# Patient Record
Sex: MALE | Race: WHITE | NOT HISPANIC OR LATINO
[De-identification: names, ages, dates, MRNs, and addresses within clinical notes are randomized per-mention and may not be internally consistent; named-entity substitution may affect disease eponyms.]

---

## 2017-02-27 ENCOUNTER — CLINICAL ADVICE (OUTPATIENT)
Age: 82
End: 2017-02-27

## 2017-04-03 ENCOUNTER — APPOINTMENT (OUTPATIENT)
Dept: CT IMAGING | Facility: HOSPITAL | Age: 82
End: 2017-04-03

## 2017-04-03 ENCOUNTER — OUTPATIENT (OUTPATIENT)
Dept: OUTPATIENT SERVICES | Facility: HOSPITAL | Age: 82
LOS: 1 days | End: 2017-04-03
Payer: COMMERCIAL

## 2017-04-03 DIAGNOSIS — Z87.81 PERSONAL HISTORY OF (HEALED) TRAUMATIC FRACTURE: Chronic | ICD-10-CM

## 2017-04-03 DIAGNOSIS — Z96.643 PRESENCE OF ARTIFICIAL HIP JOINT, BILATERAL: Chronic | ICD-10-CM

## 2017-04-03 DIAGNOSIS — I71.4 ABDOMINAL AORTIC ANEURYSM, WITHOUT RUPTURE: Chronic | ICD-10-CM

## 2017-04-03 DIAGNOSIS — I71.9 AORTIC ANEURYSM OF UNSPECIFIED SITE, WITHOUT RUPTURE: ICD-10-CM

## 2017-04-03 DIAGNOSIS — D30.3 BENIGN NEOPLASM OF BLADDER: Chronic | ICD-10-CM

## 2017-04-03 PROCEDURE — 71250 CT THORAX DX C-: CPT

## 2017-04-03 PROCEDURE — 74176 CT ABD & PELVIS W/O CONTRAST: CPT

## 2017-04-20 ENCOUNTER — APPOINTMENT (OUTPATIENT)
Dept: THORACIC SURGERY | Facility: CLINIC | Age: 82
End: 2017-04-20

## 2017-04-20 VITALS
BODY MASS INDEX: 23.99 KG/M2 | DIASTOLIC BLOOD PRESSURE: 74 MMHG | HEIGHT: 65 IN | OXYGEN SATURATION: 94 % | RESPIRATION RATE: 16 BRPM | WEIGHT: 143.96 LBS | SYSTOLIC BLOOD PRESSURE: 120 MMHG | HEART RATE: 72 BPM

## 2017-04-25 ENCOUNTER — OUTPATIENT (OUTPATIENT)
Dept: OUTPATIENT SERVICES | Facility: HOSPITAL | Age: 82
LOS: 1 days | End: 2017-04-25
Payer: COMMERCIAL

## 2017-04-25 VITALS
WEIGHT: 143.96 LBS | HEART RATE: 64 BPM | TEMPERATURE: 98 F | SYSTOLIC BLOOD PRESSURE: 100 MMHG | RESPIRATION RATE: 16 BRPM | HEIGHT: 65 IN | DIASTOLIC BLOOD PRESSURE: 60 MMHG | OXYGEN SATURATION: 96 %

## 2017-04-25 DIAGNOSIS — Z96.643 PRESENCE OF ARTIFICIAL HIP JOINT, BILATERAL: Chronic | ICD-10-CM

## 2017-04-25 DIAGNOSIS — N32.9 BLADDER DISORDER, UNSPECIFIED: ICD-10-CM

## 2017-04-25 DIAGNOSIS — Z87.81 PERSONAL HISTORY OF (HEALED) TRAUMATIC FRACTURE: Chronic | ICD-10-CM

## 2017-04-25 DIAGNOSIS — D30.3 BENIGN NEOPLASM OF BLADDER: Chronic | ICD-10-CM

## 2017-04-25 DIAGNOSIS — N32.89 OTHER SPECIFIED DISORDERS OF BLADDER: ICD-10-CM

## 2017-04-25 DIAGNOSIS — Z01.818 ENCOUNTER FOR OTHER PREPROCEDURAL EXAMINATION: ICD-10-CM

## 2017-04-25 DIAGNOSIS — I71.4 ABDOMINAL AORTIC ANEURYSM, WITHOUT RUPTURE: Chronic | ICD-10-CM

## 2017-04-25 PROCEDURE — 71046 X-RAY EXAM CHEST 2 VIEWS: CPT

## 2017-04-25 PROCEDURE — 71020: CPT | Mod: 26

## 2017-04-25 PROCEDURE — G0463: CPT

## 2017-04-25 RX ORDER — SODIUM CHLORIDE 9 MG/ML
3 INJECTION INTRAMUSCULAR; INTRAVENOUS; SUBCUTANEOUS EVERY 8 HOURS
Qty: 0 | Refills: 0 | Status: DISCONTINUED | OUTPATIENT
Start: 2017-05-01 | End: 2017-05-09

## 2017-04-25 RX ORDER — ASPIRIN/CALCIUM CARB/MAGNESIUM 324 MG
1 TABLET ORAL
Qty: 0 | Refills: 0 | COMMUNITY

## 2017-04-25 NOTE — H&P PST ADULT - HISTORY OF PRESENT ILLNESS
Patient presented with wife, pt is poor historian. 80 yo  male  from home with PMH of HTN, Dementia, COPD (no intubation in past), BPH, Bladder mass, UTI , lung nodule and syncope x 3 ( last time few weeks ago- near syncope) presented with bladder lesions found upon cytoscopy done at Urology office in February 2017. Diagnosed with disorder of  bladder is scheduled for cytoscopy and transurethral resection of bladder tumor on 5/1/17.

## 2017-04-25 NOTE — H&P PST ADULT - ASSESSMENT
81 yo  male  from home with PMH of HTN, Dementia, COPD (no intubation in past), BPH, Bladder mass, recent UTI and syncope ( D/C from hospital on June 5, 2016) presented with malignant neoplasm of bladder . 81 yo  male  from home with PMH of HTN, Dementia, COPD (no intubation in past), BPH, Bladder mass, UTI , syncope, right lung mass, aortic aneurysm presented with disorder of bladder.

## 2017-04-25 NOTE — H&P PST ADULT - MUSCULOSKELETAL
details… detailed exam ROM intact/no joint erythema/no calf tenderness/diminished strength/no joint warmth/no joint swelling

## 2017-04-25 NOTE — H&P PST ADULT - RADIOLOGY RESULTS AND INTERPRETATION
The previously noted 5 mm nodular density projecting over the right midlung zone is not definitely appreciated on the current examination, probably obscured by ribs. Again if clinically indicated, chest CT without IV contrast may be pursued for further evaluation. Small left basilar atelectasis. Elevation of the left hemidiaphragm. No evidence for pleural effusion or pneumothorax. The cardiac silhouette is within normal limits.  Above results from June 2, 2016 of chest xray done during hospitalization faxed to PCP, Dr. Joe to acknowledge and to f/u on CT and to update her medical clearance, Patient asymptomatic at PST today 07/12/16

## 2017-04-25 NOTE — H&P PST ADULT - PSH
AAA (abdominal aortic aneurysm)  treated surgically in 2007  Abdominal aneurysm  repair- 2003  Benign bladder tumor  s/p TURPT- 2006, July 2016  Bladder cancer  low grade  History of wrist fracture  s/p sx of right- 2006  S/P hip replacement, bilateral  2004, 2005  S/P wrist surgery

## 2017-04-25 NOTE — H&P PST ADULT - PMH
AAA (abdominal aortic aneurysm)    Alzheimer disease    BPH (benign prostatic hyperplasia)    CAD (coronary artery disease)  no obstructive  Cataract  bilateral eyes  Cigarette nicotine dependence    CKD (chronic kidney disease), stage 3 (moderate)    COPD (chronic obstructive pulmonary disease)    HLD (hyperlipidemia)    Hypercholesteremia    Lung nodule  right  Primary osteoarthritis of both hips    Syncope  and near syncope  UTI (urinary tract infection)    Vitamin D deficiency

## 2017-04-25 NOTE — H&P PST ADULT - NEGATIVE GENERAL GENITOURINARY SYMPTOMS
no renal colic/no hematuria/no incontinence/no urine discoloration/no dysuria/no flank pain R/no flank pain L

## 2017-04-25 NOTE — H&P PST ADULT - PRIMARY CARE PROVIDER
Dr. David Vinson MD [facility] [phone], Freddy Us MD Cardiothoracic [phone], Josué Saleem MD Cardiovascular [phone]

## 2017-04-25 NOTE — H&P PST ADULT - VENOUS THROMBOEMBOLISM CURRENT STATUS
present cancer or chemotherapy/major surgery, including arthroscopic and laparoscopic (greater than 1 hr)/abnormal pulmonary function (COPD)

## 2017-04-25 NOTE — H&P PST ADULT - RS GEN PE MLT RESP DETAILS PC
no subcutaneous emphysema/breath sounds equal/no wheezes/no rales/no rhonchi/respirations non-labored/no chest wall tenderness/no intercostal retractions/airway patent

## 2017-04-25 NOTE — H&P PST ADULT - PROBLEM SELECTOR PLAN 1
Patient scheduled for cytoscopy and transurethral resection of bladder tumor on 5/1/17.Patient is at moderate risk for this surgery.

## 2017-04-25 NOTE — H&P PST ADULT - NSANTHOSAYNRD_GEN_A_CORE
No. PAM screening performed.  STOP BANG Legend: 0-2 = LOW Risk; 3-4 = INTERMEDIATE Risk; 5-8 = HIGH Risk/pt advised to see pulmonology to be assessed for sleep apnea

## 2017-04-25 NOTE — H&P PST ADULT - GASTROINTESTINAL DETAILS
nontender/no masses palpable/no distention/no rebound tenderness/no guarding/bowel sounds normal/no rigidity/soft

## 2017-04-30 ENCOUNTER — RESULT REVIEW (OUTPATIENT)
Age: 82
End: 2017-04-30

## 2017-05-01 ENCOUNTER — TRANSCRIPTION ENCOUNTER (OUTPATIENT)
Age: 82
End: 2017-05-01

## 2017-05-01 ENCOUNTER — OUTPATIENT (OUTPATIENT)
Dept: OUTPATIENT SERVICES | Facility: HOSPITAL | Age: 82
LOS: 1 days | Discharge: HOME | End: 2017-05-01
Payer: COMMERCIAL

## 2017-05-01 VITALS
SYSTOLIC BLOOD PRESSURE: 111 MMHG | HEART RATE: 68 BPM | RESPIRATION RATE: 16 BRPM | DIASTOLIC BLOOD PRESSURE: 76 MMHG | OXYGEN SATURATION: 96 % | TEMPERATURE: 98 F

## 2017-05-01 VITALS
RESPIRATION RATE: 14 BRPM | DIASTOLIC BLOOD PRESSURE: 64 MMHG | SYSTOLIC BLOOD PRESSURE: 101 MMHG | HEIGHT: 65 IN | TEMPERATURE: 98 F | WEIGHT: 143.96 LBS | HEART RATE: 62 BPM | OXYGEN SATURATION: 98 %

## 2017-05-01 DIAGNOSIS — I71.4 ABDOMINAL AORTIC ANEURYSM, WITHOUT RUPTURE: Chronic | ICD-10-CM

## 2017-05-01 DIAGNOSIS — Z01.818 ENCOUNTER FOR OTHER PREPROCEDURAL EXAMINATION: ICD-10-CM

## 2017-05-01 DIAGNOSIS — Z87.81 PERSONAL HISTORY OF (HEALED) TRAUMATIC FRACTURE: Chronic | ICD-10-CM

## 2017-05-01 DIAGNOSIS — N32.89 OTHER SPECIFIED DISORDERS OF BLADDER: ICD-10-CM

## 2017-05-01 DIAGNOSIS — Z96.643 PRESENCE OF ARTIFICIAL HIP JOINT, BILATERAL: Chronic | ICD-10-CM

## 2017-05-01 DIAGNOSIS — D30.3 BENIGN NEOPLASM OF BLADDER: Chronic | ICD-10-CM

## 2017-05-01 PROCEDURE — 52234 CYSTOSCOPY AND TREATMENT: CPT

## 2017-05-01 PROCEDURE — C1769: CPT

## 2017-05-01 PROCEDURE — 88307 TISSUE EXAM BY PATHOLOGIST: CPT

## 2017-05-01 PROCEDURE — 88307 TISSUE EXAM BY PATHOLOGIST: CPT | Mod: 26

## 2017-05-01 RX ORDER — HYDROMORPHONE HYDROCHLORIDE 2 MG/ML
0.25 INJECTION INTRAMUSCULAR; INTRAVENOUS; SUBCUTANEOUS
Qty: 0 | Refills: 0 | Status: DISCONTINUED | OUTPATIENT
Start: 2017-05-01 | End: 2017-05-01

## 2017-05-01 RX ORDER — MEMANTINE HYDROCHLORIDE 10 MG/1
1 TABLET ORAL
Qty: 0 | Refills: 0 | COMMUNITY
Start: 2017-05-01

## 2017-05-01 RX ORDER — SODIUM CHLORIDE 9 MG/ML
1000 INJECTION, SOLUTION INTRAVENOUS
Qty: 0 | Refills: 0 | Status: DISCONTINUED | OUTPATIENT
Start: 2017-05-01 | End: 2017-05-01

## 2017-05-01 RX ORDER — ASPIRIN/CALCIUM CARB/MAGNESIUM 324 MG
1 TABLET ORAL
Qty: 0 | Refills: 0 | COMMUNITY
Start: 2017-05-01

## 2017-05-01 RX ORDER — CHOLECALCIFEROL (VITAMIN D3) 125 MCG
1 CAPSULE ORAL
Qty: 0 | Refills: 0 | COMMUNITY
Start: 2017-05-01

## 2017-05-01 RX ORDER — DONEPEZIL HYDROCHLORIDE 10 MG/1
1 TABLET, FILM COATED ORAL
Qty: 0 | Refills: 0 | COMMUNITY
Start: 2017-05-01

## 2017-05-01 RX ORDER — SIMVASTATIN 20 MG/1
1 TABLET, FILM COATED ORAL
Qty: 0 | Refills: 0 | COMMUNITY
Start: 2017-05-01

## 2017-05-09 DIAGNOSIS — R55 SYNCOPE AND COLLAPSE: ICD-10-CM

## 2017-05-09 DIAGNOSIS — G30.8 OTHER ALZHEIMER'S DISEASE: ICD-10-CM

## 2017-05-09 DIAGNOSIS — Z87.891 PERSONAL HISTORY OF NICOTINE DEPENDENCE: ICD-10-CM

## 2017-05-09 DIAGNOSIS — F02.80 DEMENTIA IN OTHER DISEASES CLASSIFIED ELSEWHERE, UNSPECIFIED SEVERITY, WITHOUT BEHAVIORAL DISTURBANCE, PSYCHOTIC DISTURBANCE, MOOD DISTURBANCE, AND ANXIETY: ICD-10-CM

## 2017-05-09 DIAGNOSIS — N32.89 OTHER SPECIFIED DISORDERS OF BLADDER: ICD-10-CM

## 2017-05-09 DIAGNOSIS — R91.1 SOLITARY PULMONARY NODULE: ICD-10-CM

## 2017-05-09 DIAGNOSIS — Z87.440 PERSONAL HISTORY OF URINARY (TRACT) INFECTIONS: ICD-10-CM

## 2017-05-09 DIAGNOSIS — I25.10 ATHEROSCLEROTIC HEART DISEASE OF NATIVE CORONARY ARTERY WITHOUT ANGINA PECTORIS: ICD-10-CM

## 2017-05-09 DIAGNOSIS — E78.5 HYPERLIPIDEMIA, UNSPECIFIED: ICD-10-CM

## 2017-05-09 DIAGNOSIS — Z79.82 LONG TERM (CURRENT) USE OF ASPIRIN: ICD-10-CM

## 2017-05-09 DIAGNOSIS — E55.9 VITAMIN D DEFICIENCY, UNSPECIFIED: ICD-10-CM

## 2017-05-09 DIAGNOSIS — M16.0 BILATERAL PRIMARY OSTEOARTHRITIS OF HIP: ICD-10-CM

## 2017-05-09 DIAGNOSIS — N40.0 BENIGN PROSTATIC HYPERPLASIA WITHOUT LOWER URINARY TRACT SYMPTOMS: ICD-10-CM

## 2017-05-09 DIAGNOSIS — I10 ESSENTIAL (PRIMARY) HYPERTENSION: ICD-10-CM

## 2017-05-09 DIAGNOSIS — J44.9 CHRONIC OBSTRUCTIVE PULMONARY DISEASE, UNSPECIFIED: ICD-10-CM

## 2017-05-15 ENCOUNTER — FORM ENCOUNTER (OUTPATIENT)
Age: 82
End: 2017-05-15

## 2017-05-16 ENCOUNTER — APPOINTMENT (OUTPATIENT)
Dept: CT IMAGING | Facility: HOSPITAL | Age: 82
End: 2017-05-16

## 2017-05-16 ENCOUNTER — OUTPATIENT (OUTPATIENT)
Dept: OUTPATIENT SERVICES | Facility: HOSPITAL | Age: 82
LOS: 1 days | End: 2017-05-16
Payer: COMMERCIAL

## 2017-05-16 DIAGNOSIS — Z87.81 PERSONAL HISTORY OF (HEALED) TRAUMATIC FRACTURE: Chronic | ICD-10-CM

## 2017-05-16 DIAGNOSIS — Z96.643 PRESENCE OF ARTIFICIAL HIP JOINT, BILATERAL: Chronic | ICD-10-CM

## 2017-05-16 DIAGNOSIS — I71.4 ABDOMINAL AORTIC ANEURYSM, WITHOUT RUPTURE: Chronic | ICD-10-CM

## 2017-05-16 DIAGNOSIS — D30.3 BENIGN NEOPLASM OF BLADDER: Chronic | ICD-10-CM

## 2017-05-16 DIAGNOSIS — R91.1 SOLITARY PULMONARY NODULE: ICD-10-CM

## 2017-05-16 PROCEDURE — 71250 CT THORAX DX C-: CPT

## 2017-06-07 ENCOUNTER — APPOINTMENT (OUTPATIENT)
Dept: THORACIC SURGERY | Facility: CLINIC | Age: 82
End: 2017-06-07

## 2017-06-07 VITALS — OXYGEN SATURATION: 93 %

## 2017-06-07 VITALS
SYSTOLIC BLOOD PRESSURE: 128 MMHG | WEIGHT: 144 LBS | HEART RATE: 68 BPM | DIASTOLIC BLOOD PRESSURE: 68 MMHG | BODY MASS INDEX: 23.96 KG/M2 | TEMPERATURE: 97.6 F

## 2017-08-11 ENCOUNTER — INPATIENT (INPATIENT)
Facility: HOSPITAL | Age: 82
LOS: 6 days | Discharge: HOME HEALTH | DRG: 689 | End: 2017-08-18
Attending: INTERNAL MEDICINE | Admitting: INTERNAL MEDICINE
Payer: COMMERCIAL

## 2017-08-11 VITALS
HEIGHT: 68 IN | WEIGHT: 160.06 LBS | OXYGEN SATURATION: 98 % | RESPIRATION RATE: 16 BRPM | HEART RATE: 79 BPM | TEMPERATURE: 99 F | DIASTOLIC BLOOD PRESSURE: 78 MMHG | SYSTOLIC BLOOD PRESSURE: 101 MMHG

## 2017-08-11 DIAGNOSIS — I95.9 HYPOTENSION, UNSPECIFIED: ICD-10-CM

## 2017-08-11 DIAGNOSIS — N17.9 ACUTE KIDNEY FAILURE, UNSPECIFIED: ICD-10-CM

## 2017-08-11 DIAGNOSIS — Z96.643 PRESENCE OF ARTIFICIAL HIP JOINT, BILATERAL: Chronic | ICD-10-CM

## 2017-08-11 DIAGNOSIS — Z87.81 PERSONAL HISTORY OF (HEALED) TRAUMATIC FRACTURE: Chronic | ICD-10-CM

## 2017-08-11 DIAGNOSIS — Z29.9 ENCOUNTER FOR PROPHYLACTIC MEASURES, UNSPECIFIED: ICD-10-CM

## 2017-08-11 DIAGNOSIS — R53.1 WEAKNESS: ICD-10-CM

## 2017-08-11 DIAGNOSIS — R91.8 OTHER NONSPECIFIC ABNORMAL FINDING OF LUNG FIELD: ICD-10-CM

## 2017-08-11 DIAGNOSIS — R26.81 UNSTEADINESS ON FEET: ICD-10-CM

## 2017-08-11 DIAGNOSIS — N39.0 URINARY TRACT INFECTION, SITE NOT SPECIFIED: ICD-10-CM

## 2017-08-11 DIAGNOSIS — G30.9 ALZHEIMER'S DISEASE, UNSPECIFIED: ICD-10-CM

## 2017-08-11 DIAGNOSIS — D30.3 BENIGN NEOPLASM OF BLADDER: Chronic | ICD-10-CM

## 2017-08-11 DIAGNOSIS — I71.4 ABDOMINAL AORTIC ANEURYSM, WITHOUT RUPTURE: Chronic | ICD-10-CM

## 2017-08-11 LAB
ALBUMIN SERPL ELPH-MCNC: 2.8 G/DL — LOW (ref 3.5–5)
ALP SERPL-CCNC: 106 U/L — SIGNIFICANT CHANGE UP (ref 40–120)
ALT FLD-CCNC: 41 U/L DA — SIGNIFICANT CHANGE UP (ref 10–60)
ANION GAP SERPL CALC-SCNC: 7 MMOL/L — SIGNIFICANT CHANGE UP (ref 5–17)
APPEARANCE UR: ABNORMAL
AST SERPL-CCNC: 42 U/L — HIGH (ref 10–40)
BILIRUB SERPL-MCNC: 1.8 MG/DL — HIGH (ref 0.2–1.2)
BILIRUB UR-MCNC: NEGATIVE — SIGNIFICANT CHANGE UP
BUN SERPL-MCNC: 18 MG/DL — SIGNIFICANT CHANGE UP (ref 7–18)
CALCIUM SERPL-MCNC: 8.5 MG/DL — SIGNIFICANT CHANGE UP (ref 8.4–10.5)
CHLORIDE SERPL-SCNC: 102 MMOL/L — SIGNIFICANT CHANGE UP (ref 96–108)
CO2 SERPL-SCNC: 28 MMOL/L — SIGNIFICANT CHANGE UP (ref 22–31)
COLOR SPEC: YELLOW — SIGNIFICANT CHANGE UP
CREAT SERPL-MCNC: 1.46 MG/DL — HIGH (ref 0.5–1.3)
DIFF PNL FLD: ABNORMAL
GLUCOSE SERPL-MCNC: 106 MG/DL — HIGH (ref 70–99)
GLUCOSE UR QL: NEGATIVE — SIGNIFICANT CHANGE UP
HCT VFR BLD CALC: 43.8 % — SIGNIFICANT CHANGE UP (ref 39–50)
HGB BLD-MCNC: 14.4 G/DL — SIGNIFICANT CHANGE UP (ref 13–17)
KETONES UR-MCNC: ABNORMAL
LACTATE SERPL-SCNC: 1.8 MMOL/L — SIGNIFICANT CHANGE UP (ref 0.7–2)
LACTATE SERPL-SCNC: 2.4 MMOL/L — HIGH (ref 0.7–2)
LEUKOCYTE ESTERASE UR-ACNC: ABNORMAL
MCHC RBC-ENTMCNC: 31.8 PG — SIGNIFICANT CHANGE UP (ref 27–34)
MCHC RBC-ENTMCNC: 32.8 GM/DL — SIGNIFICANT CHANGE UP (ref 32–36)
MCV RBC AUTO: 96.7 FL — SIGNIFICANT CHANGE UP (ref 80–100)
NITRITE UR-MCNC: NEGATIVE — SIGNIFICANT CHANGE UP
PH UR: 5 — SIGNIFICANT CHANGE UP (ref 5–8)
PLATELET # BLD AUTO: 228 K/UL — SIGNIFICANT CHANGE UP (ref 150–400)
POTASSIUM SERPL-MCNC: 4.1 MMOL/L — SIGNIFICANT CHANGE UP (ref 3.5–5.3)
POTASSIUM SERPL-SCNC: 4.1 MMOL/L — SIGNIFICANT CHANGE UP (ref 3.5–5.3)
PROT SERPL-MCNC: 7.1 G/DL — SIGNIFICANT CHANGE UP (ref 6–8.3)
PROT UR-MCNC: 30 MG/DL
RBC # BLD: 4.53 M/UL — SIGNIFICANT CHANGE UP (ref 4.2–5.8)
RBC # FLD: 13.1 % — SIGNIFICANT CHANGE UP (ref 10.3–14.5)
SODIUM SERPL-SCNC: 137 MMOL/L — SIGNIFICANT CHANGE UP (ref 135–145)
SP GR SPEC: 1.02 — SIGNIFICANT CHANGE UP (ref 1.01–1.02)
UROBILINOGEN FLD QL: NEGATIVE — SIGNIFICANT CHANGE UP
WBC # BLD: 21.5 K/UL — HIGH (ref 3.8–10.5)
WBC # FLD AUTO: 21.5 K/UL — HIGH (ref 3.8–10.5)

## 2017-08-11 PROCEDURE — 71020: CPT | Mod: 26

## 2017-08-11 PROCEDURE — 99285 EMERGENCY DEPT VISIT HI MDM: CPT

## 2017-08-11 RX ORDER — CEFTRIAXONE 500 MG/1
1 INJECTION, POWDER, FOR SOLUTION INTRAMUSCULAR; INTRAVENOUS ONCE
Qty: 0 | Refills: 0 | Status: COMPLETED | OUTPATIENT
Start: 2017-08-11 | End: 2017-08-11

## 2017-08-11 RX ORDER — SODIUM CHLORIDE 9 MG/ML
1000 INJECTION INTRAMUSCULAR; INTRAVENOUS; SUBCUTANEOUS ONCE
Qty: 0 | Refills: 0 | Status: COMPLETED | OUTPATIENT
Start: 2017-08-11 | End: 2017-08-11

## 2017-08-11 RX ORDER — ENOXAPARIN SODIUM 100 MG/ML
40 INJECTION SUBCUTANEOUS DAILY
Qty: 0 | Refills: 0 | Status: DISCONTINUED | OUTPATIENT
Start: 2017-08-11 | End: 2017-08-18

## 2017-08-11 RX ORDER — CEFTRIAXONE 500 MG/1
1 INJECTION, POWDER, FOR SOLUTION INTRAMUSCULAR; INTRAVENOUS EVERY 24 HOURS
Qty: 0 | Refills: 0 | Status: DISCONTINUED | OUTPATIENT
Start: 2017-08-12 | End: 2017-08-15

## 2017-08-11 RX ORDER — SODIUM CHLORIDE 9 MG/ML
1000 INJECTION INTRAMUSCULAR; INTRAVENOUS; SUBCUTANEOUS
Qty: 0 | Refills: 0 | Status: DISCONTINUED | OUTPATIENT
Start: 2017-08-11 | End: 2017-08-12

## 2017-08-11 RX ORDER — SIMVASTATIN 20 MG/1
20 TABLET, FILM COATED ORAL AT BEDTIME
Qty: 0 | Refills: 0 | Status: DISCONTINUED | OUTPATIENT
Start: 2017-08-11 | End: 2017-08-15

## 2017-08-11 RX ORDER — DONEPEZIL HYDROCHLORIDE 10 MG/1
10 TABLET, FILM COATED ORAL AT BEDTIME
Qty: 0 | Refills: 0 | Status: DISCONTINUED | OUTPATIENT
Start: 2017-08-11 | End: 2017-08-13

## 2017-08-11 RX ORDER — MEMANTINE HYDROCHLORIDE 10 MG/1
10 TABLET ORAL
Qty: 0 | Refills: 0 | Status: DISCONTINUED | OUTPATIENT
Start: 2017-08-11 | End: 2017-08-13

## 2017-08-11 RX ORDER — CEFTRIAXONE 500 MG/1
INJECTION, POWDER, FOR SOLUTION INTRAMUSCULAR; INTRAVENOUS
Qty: 0 | Refills: 0 | Status: DISCONTINUED | OUTPATIENT
Start: 2017-08-11 | End: 2017-08-15

## 2017-08-11 RX ORDER — CHOLECALCIFEROL (VITAMIN D3) 125 MCG
1000 CAPSULE ORAL DAILY
Qty: 0 | Refills: 0 | Status: DISCONTINUED | OUTPATIENT
Start: 2017-08-11 | End: 2017-08-18

## 2017-08-11 RX ORDER — ASPIRIN/CALCIUM CARB/MAGNESIUM 324 MG
81 TABLET ORAL DAILY
Qty: 0 | Refills: 0 | Status: DISCONTINUED | OUTPATIENT
Start: 2017-08-11 | End: 2017-08-18

## 2017-08-11 RX ORDER — CEFEPIME 1 G/1
1000 INJECTION, POWDER, FOR SOLUTION INTRAMUSCULAR; INTRAVENOUS ONCE
Qty: 0 | Refills: 0 | Status: COMPLETED | OUTPATIENT
Start: 2017-08-11 | End: 2017-08-11

## 2017-08-11 RX ADMIN — SODIUM CHLORIDE 75 MILLILITER(S): 9 INJECTION INTRAMUSCULAR; INTRAVENOUS; SUBCUTANEOUS at 21:38

## 2017-08-11 RX ADMIN — DONEPEZIL HYDROCHLORIDE 10 MILLIGRAM(S): 10 TABLET, FILM COATED ORAL at 21:37

## 2017-08-11 RX ADMIN — SODIUM CHLORIDE 4000 MILLILITER(S): 9 INJECTION INTRAMUSCULAR; INTRAVENOUS; SUBCUTANEOUS at 13:16

## 2017-08-11 RX ADMIN — Medication 1000 UNIT(S): at 21:37

## 2017-08-11 RX ADMIN — SODIUM CHLORIDE 4000 MILLILITER(S): 9 INJECTION INTRAMUSCULAR; INTRAVENOUS; SUBCUTANEOUS at 14:38

## 2017-08-11 RX ADMIN — Medication 81 MILLIGRAM(S): at 21:37

## 2017-08-11 RX ADMIN — SODIUM CHLORIDE 4000 MILLILITER(S): 9 INJECTION INTRAMUSCULAR; INTRAVENOUS; SUBCUTANEOUS at 17:12

## 2017-08-11 RX ADMIN — SIMVASTATIN 20 MILLIGRAM(S): 20 TABLET, FILM COATED ORAL at 21:37

## 2017-08-11 RX ADMIN — CEFEPIME 100 MILLIGRAM(S): 1 INJECTION, POWDER, FOR SOLUTION INTRAMUSCULAR; INTRAVENOUS at 14:38

## 2017-08-11 RX ADMIN — CEFTRIAXONE 100 GRAM(S): 500 INJECTION, POWDER, FOR SOLUTION INTRAMUSCULAR; INTRAVENOUS at 22:54

## 2017-08-11 NOTE — ED PROVIDER NOTE - MEDICAL DECISION MAKING DETAILS
generalized weakness, likely due to infection given wbc of 21k  ua/u culture pending. cxr no new consolidations compared to ct done recently. iv fluids given, cefepime given. will admit for further mgmt

## 2017-08-11 NOTE — ED ADULT NURSE NOTE - OBJECTIVE STATEMENT
pt came in with wife states they went to MD office and went there for ff- up  after post bladder resection  pt BP went down to 500 pt sent from MD office to ED for further evaluation pt awake alert oriented x 2 wife on bedside no shortness of breath pt airway patent w/ NAD

## 2017-08-11 NOTE — H&P ADULT - PROBLEM SELECTOR PLAN 5
CT chest form May 2017 showed: Slight interval decrease in size of right middle lobe spiculated nodular opacity, now measuring 2.7 x 1.4 cm (previously 3.0 x 1.6 cm).   Patient denies any cough, sputum production or hemoptysis.   f/u Repeat CT chest.

## 2017-08-11 NOTE — H&P ADULT - PROBLEM SELECTOR PLAN 3
Creatinine of 1.46. (was 0.55 in Jun 2016), likely 2/2 dehydration with UTI.   Continue with Hydration for now.  f/u BMP in am.  If not improving, consider sending urine lytes.

## 2017-08-11 NOTE — H&P ADULT - PROBLEM SELECTOR PLAN 4
Likely due to dehydration 2/2 UTI or could be deconditions from advancing age.   Maintain adequate hydration and nutrition.  f/u Vitamin b12, folate and Vitamin D.  PT consult.  Fall precautions.

## 2017-08-11 NOTE — ED PROVIDER NOTE - OBJECTIVE STATEMENT
83 y/o male with PMHx of HTN, Alzheimer's, AAA, BPH, CAD, CKD, COPD, HLD, Lung Nodule, and Osteoarthritis and PSHx of Bladder Tumor removal BIB wife to the ED c/o increased fatigue and sleeping x2 days. Pt denies fever, chills, cough congestion, or any other complaints. NKDA. 83 y/o male with PMHx of HTN, Alzheimer's, AAA, BPH, CAD, CKD, COPD, HLD, Lung Nodule, and Osteoarthritis and PSHx of Bladder Tumor removal BIB wife to the ED c/o increased fatigue and sleeping x2 days. Pt denies fever, chills, cough congestion, CP, SOB, nausea, vomiting, or any other complaints. NKDA.

## 2017-08-11 NOTE — ED ADULT NURSE NOTE - CHIEF COMPLAINT QUOTE
sent from doctor office, per wife went  for follow up visit post bladder resection, patient became hypotensive  at doctors office, 200ml NS given by ems

## 2017-08-11 NOTE — H&P ADULT - NSHPLABSRESULTS_GEN_ALL_CORE
CBC:                       14.4   21.5  )-----------( 228      ( 11 Aug 2017 13:09 )             43.8     BMP:    08-11    137  |  102  |  18  ----------------------------<  106<H>  4.1   |  28  |  1.46<H>    Ca    8.5      11 Aug 2017 13:09    TPro  7.1  /  Alb  2.8<L>  /  TBili  1.8<H>  /  DBili  x   /  AST  42<H>  /  ALT  41  /  AlkPhos  106  08-11      CXR: No focal lung consolidation. Spiculated nodular densities described on CT the chest from May 16, 2017 are not well demonstrated on plain radiograph.  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 CBC:                       14.4   21.5  )-----------( 228      ( 11 Aug 2017 13:09 )             43.8     BMP:        137  |  102  |  18  ----------------------------<  106<H>  4.1   |  28  |  1.46<H>    Ca    8.5      11 Aug 2017 13:09    TPro  7.1  /  Alb  2.8<L>  /  TBili  1.8<H>  /  DBili  x   /  AST  42<H>  /  ALT  41  /  AlkPhos  106            Urinalysis Basic - ( 11 Aug 2017 14:40 )    Color: Yellow / Appearance: x / S.020 / pH: x  Gluc: x / Ketone: Small  / Bili: Negative / Urobili: Negative   Blood: x / Protein: 30 mg/dL / Nitrite: Negative   Leuk Esterase: Moderate / RBC: 2-5 /HPF / WBC 26-50 /HPF   Sq Epi: x / Non Sq Epi: Few / Bacteria: Moderate /HPF      CXR: No focal lung consolidation. Spiculated nodular densities described on CT the chest from May 16, 2017 are not well demonstrated on plain radiograph.  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

## 2017-08-11 NOTE — H&P ADULT - ATTENDING COMMENTS
Patient seen and examined; Agree with PGY2 MAR A/P above with editing as needed.    82 years old male from home, AAO x 3 but poor historian, lives with wife, walks independently, PMH of Alzheimer's, AAA (stable) , BPH, CKD, COPD, HLD, and Osteoarthritis and PSHx of Bladder Tumor (s/p resection in May 2017) brought in by wife to ED for c/o increased fatigue and weakness. Patient reported that he missed 2 steps while coming out of apartment building and fell down on his left side, got up by himself , was found to have bruise on left arm by wife. Denies any chest pain, sob, palpitations, abdominal pain, nausea, vomiting, headache, fever, recent travel. Denies any changes in BM. Denies any urinary complains including burning, dysuria or frequency.     In ED, upon admission T of 99.2, HR of 79, /78, RR 16, spO2 98%. EKG showed NSR. CXR : No focal lung consolidation. 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 Labs significant for elevated wbc of 21.5, Lactate of 2.4, UA positive for wbc of 26-50, moderate Leukocyte esterase.      D/D  Sepsis with UTI  Oliguria possible Prerenal azotemia due to dehydration  PMH as above    Plan:  Urine Cx  Ceftriaxone IV  Continue Home meds Patient seen and examined; Agree with PGY2 MAR A/P above with editing as needed.    82 years old male from home, AAO x 3 but poor historian, lives with wife, walks independently, PMH of Alzheimer's, AAA (stable) , BPH, CKD, COPD, HLD, and Osteoarthritis and PSHx of Bladder Tumor (s/p resection in May 2017) brought in by wife to ED for c/o increased fatigue and weakness. Patient reported that he missed 2 steps while coming out of apartment building and fell down on his left side, got up by himself , was found to have bruise on left arm by wife. Denies any chest pain, sob, palpitations, abdominal pain, nausea, vomiting, headache, fever, recent travel. Denies any changes in BM. Denies any urinary complains including burning, dysuria or frequency.     In ED, upon admission T of 99.2, HR of 79, /78, RR 16, spO2 98%. EKG showed NSR. CXR : No focal lung consolidation. 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 Labs significant for elevated wbc of 21.5, Lactate of 2.4, UA positive for wbc of 26-50, moderate Leukocyte esterase.      D/D  Sepsis with UTI  Oliguria possible Prerenal azotemia due to dehydration/ SAM due to dehydration  PMH as above    Plan:  Admit to Medicine; Urine Cx  Ceftriaxone IV  Continue Home meds  Please obtain from pharmacy; Haydee Max    Discussed with PGY2 MAR Dr. connelly;  Discussed with Floor RN kayy; Fluids; Diet; IVF

## 2017-08-11 NOTE — H&P ADULT - ASSESSMENT
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 In ED, upon admission T of 99.2, HR of 79, /78, RR 16, spO2 98%. EKG showed NSR. CXR : No focal lung consolidation. 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 Labs significant for elevated wbc of 21.5, Lactate of 2.4, UA positive for wbc of 26-50, moderate Leukocyte esterase. 1 dose of Maxipime and 3 L bolus given in ED. Patient admitted to medicine floor for further management.

## 2017-08-11 NOTE — H&P ADULT - PROBLEM SELECTOR PLAN 1
UA positive for wbc of 26-50, moderate Leukocyte esterase.  Urine cultures sent, f/u results.  s/p 1 dose of Maxipime and 3 L bolus given in ED.  Continue Rocephin and maintenance fluids.   f/u Blood cultures.

## 2017-08-11 NOTE — H&P ADULT - NSHPSOCIALHISTORY_GEN_ALL_CORE
Lives with wife in an apartment, walks independently at baseline.    Former smoker, per wife, patient smoked 2 packs per day initially for 30-40 years and gradually decreased, quit 1 years ago.  Social drinker.   Denied drug abuse.

## 2017-08-11 NOTE — ED ADULT TRIAGE NOTE - CHIEF COMPLAINT QUOTE
sent from doctor office for follow up visit for bladder resection, patient became hypotensive  at doctors office, 200ml NS given by ems sent from doctor office, per wife went  for follow up visit post bladder resection, patient became hypotensive  at doctors office, 200ml NS given by ems

## 2017-08-11 NOTE — H&P ADULT - HISTORY OF PRESENT ILLNESS
82 years old male from home, AAO x 3 but poor historian, lives with wife, walks independently, PMH of HTN, Alzheimer's, AAA (stable) , BPH, CAD, CKD, COPD, HLD, Lung Nodule, and Osteoarthritis and PSHx of Bladder Tumor (s/p resection in May 2017) brought in by wife to ED for c/o increased fatigue and weakness. Patient reported that he missed 2 steps while coming out of apartment building and fell down on his left side, got up by himself , was found to have bruise on left arm by wife. Denies any chest pain, sob, palpitations, abdominal pain, nausea, vomiting, headache, fever, recent travel. Denies any changes in BM. Denies any urinary complains including burning, dysuria or frequency.   In ED, upon admission T of 99.2, HR of 79, /78, RR 16, spO2 98%. EKG showed NSR. CXR : No focal lung consolidation. 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 Labs significant for elevated wbc of 21.5, Lactate of 2.4, UA positive for wbc of 26-50, moderate Leukocyte esterase. 82 years old male from home, AAO x 3 but poor historian, lives with wife, walks independently, PMH of Alzheimer's, AAA (stable) , BPH, CKD, COPD, HLD, and Osteoarthritis and PSHx of Bladder Tumor (s/p resection in May 2017) brought in by wife to ED for c/o increased fatigue and weakness. Patient reported that he missed 2 steps while coming out of apartment building and fell down on his left side, got up by himself , was found to have bruise on left arm by wife. Denies any chest pain, sob, palpitations, abdominal pain, nausea, vomiting, headache, fever, recent travel. Denies any changes in BM. Denies any urinary complains including burning, dysuria or frequency.   In ED, upon admission T of 99.2, HR of 79, /78, RR 16, spO2 98%. EKG showed NSR. CXR : No focal lung consolidation. 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 Labs significant for elevated wbc of 21.5, Lactate of 2.4, UA positive for wbc of 26-50, moderate Leukocyte esterase. 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 Patient reported that he missed 2 steps while coming out of apartment building and fell down on his left side, got up by himself , was complaining to have bruise on left arm. Denies any chest pain, sob, palpitations, abdominal pain, nausea, vomiting, headache, fever, recent travel. Denies any changes in BM. Denies any urinary complains including burning, dysuria or frequency. Called wife who provided a detailed history. Per wife, patient did not had any fall, was having unsteady gait and increased fatigue and has been sleeping more lately. Wife took patient to PCP's office and was found to have BP in 80s. So PCP recommended to come to ED for further evaluation. 911 was called and patient was brought to ED.   In ED, upon admission T of 99.2, HR of 79, /78, RR 16, spO2 98%. EKG showed NSR. CXR : No focal lung consolidation. 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 Labs significant for elevated wbc of 21.5, Lactate of 2.4, UA positive for wbc of 26-50, moderate Leukocyte esterase. 1 dose of Maxipime and 3 L bolus given in ED. Patient admitted to medicine floor for further management.

## 2017-08-11 NOTE — H&P ADULT - PROBLEM SELECTOR PLAN 2
Per wife, patient's BP was in 80s at PCP's office.  Upon admission to ED, it was in 100s.  s/p 3 L bolus, continue with maintenance fluids.

## 2017-08-11 NOTE — ED PROVIDER NOTE - CHPI ED SYMPTOMS NEG
no chills/no fever/no cough, no congestion no fever/no cough, no congestion, no CP, no SOB/no chills/no vomiting/no nausea

## 2017-08-12 LAB
24R-OH-CALCIDIOL SERPL-MCNC: 33.4 NG/ML — SIGNIFICANT CHANGE UP (ref 30–100)
ANION GAP SERPL CALC-SCNC: 11 MMOL/L — SIGNIFICANT CHANGE UP (ref 5–17)
APTT BLD: 39.8 SEC — HIGH (ref 27.5–37.4)
BASOPHILS # BLD AUTO: 0.1 K/UL — SIGNIFICANT CHANGE UP (ref 0–0.2)
BASOPHILS NFR BLD AUTO: 0.5 % — SIGNIFICANT CHANGE UP (ref 0–2)
BUN SERPL-MCNC: 19 MG/DL — HIGH (ref 7–18)
CALCIUM SERPL-MCNC: 8.4 MG/DL — SIGNIFICANT CHANGE UP (ref 8.4–10.5)
CHLORIDE SERPL-SCNC: 105 MMOL/L — SIGNIFICANT CHANGE UP (ref 96–108)
CHOLEST SERPL-MCNC: 94 MG/DL — SIGNIFICANT CHANGE UP (ref 10–199)
CO2 SERPL-SCNC: 25 MMOL/L — SIGNIFICANT CHANGE UP (ref 22–31)
CREAT SERPL-MCNC: 1.2 MG/DL — SIGNIFICANT CHANGE UP (ref 0.5–1.3)
EOSINOPHIL # BLD AUTO: 0 K/UL — SIGNIFICANT CHANGE UP (ref 0–0.5)
EOSINOPHIL NFR BLD AUTO: 0.1 % — SIGNIFICANT CHANGE UP (ref 0–6)
FOLATE SERPL-MCNC: >20 NG/ML — SIGNIFICANT CHANGE UP (ref 4.8–24.2)
GLUCOSE SERPL-MCNC: 99 MG/DL — SIGNIFICANT CHANGE UP (ref 70–99)
HBA1C BLD-MCNC: 5.5 % — SIGNIFICANT CHANGE UP (ref 4–5.6)
HCT VFR BLD CALC: 44 % — SIGNIFICANT CHANGE UP (ref 39–50)
HDLC SERPL-MCNC: 36 MG/DL — LOW (ref 40–125)
HGB BLD-MCNC: 14.4 G/DL — SIGNIFICANT CHANGE UP (ref 13–17)
INR BLD: 1.71 RATIO — HIGH (ref 0.88–1.16)
LIPID PNL WITH DIRECT LDL SERPL: 46 MG/DL — SIGNIFICANT CHANGE UP
LYMPHOCYTES # BLD AUTO: 0.9 K/UL — LOW (ref 1–3.3)
LYMPHOCYTES # BLD AUTO: 4.3 % — LOW (ref 13–44)
MAGNESIUM SERPL-MCNC: 2.4 MG/DL — SIGNIFICANT CHANGE UP (ref 1.6–2.6)
MCHC RBC-ENTMCNC: 31.3 PG — SIGNIFICANT CHANGE UP (ref 27–34)
MCHC RBC-ENTMCNC: 32.6 GM/DL — SIGNIFICANT CHANGE UP (ref 32–36)
MCV RBC AUTO: 96 FL — SIGNIFICANT CHANGE UP (ref 80–100)
MONOCYTES # BLD AUTO: 1.2 K/UL — HIGH (ref 0–0.9)
MONOCYTES NFR BLD AUTO: 5.5 % — SIGNIFICANT CHANGE UP (ref 2–14)
NEUTROPHILS # BLD AUTO: 19.1 K/UL — HIGH (ref 1.8–7.4)
NEUTROPHILS NFR BLD AUTO: 89.6 % — HIGH (ref 43–77)
PHOSPHATE SERPL-MCNC: 3 MG/DL — SIGNIFICANT CHANGE UP (ref 2.5–4.5)
PLATELET # BLD AUTO: 221 K/UL — SIGNIFICANT CHANGE UP (ref 150–400)
POTASSIUM SERPL-MCNC: 4.1 MMOL/L — SIGNIFICANT CHANGE UP (ref 3.5–5.3)
POTASSIUM SERPL-SCNC: 4.1 MMOL/L — SIGNIFICANT CHANGE UP (ref 3.5–5.3)
PROTHROM AB SERPL-ACNC: 18.8 SEC — HIGH (ref 9.8–12.7)
RBC # BLD: 4.58 M/UL — SIGNIFICANT CHANGE UP (ref 4.2–5.8)
RBC # FLD: 12.9 % — SIGNIFICANT CHANGE UP (ref 10.3–14.5)
SODIUM SERPL-SCNC: 141 MMOL/L — SIGNIFICANT CHANGE UP (ref 135–145)
TOTAL CHOLESTEROL/HDL RATIO MEASUREMENT: 2.6 RATIO — LOW (ref 3.4–9.6)
TRIGL SERPL-MCNC: 60 MG/DL — SIGNIFICANT CHANGE UP (ref 10–149)
TSH SERPL-MCNC: 1.25 UU/ML — SIGNIFICANT CHANGE UP (ref 0.34–4.82)
VIT B12 SERPL-MCNC: 1213 PG/ML — HIGH (ref 243–894)
WBC # BLD: 21.3 K/UL — HIGH (ref 3.8–10.5)
WBC # FLD AUTO: 21.3 K/UL — HIGH (ref 3.8–10.5)

## 2017-08-12 PROCEDURE — 71250 CT THORAX DX C-: CPT | Mod: 26

## 2017-08-12 RX ORDER — SODIUM CHLORIDE 9 MG/ML
500 INJECTION INTRAMUSCULAR; INTRAVENOUS; SUBCUTANEOUS ONCE
Qty: 0 | Refills: 0 | Status: COMPLETED | OUTPATIENT
Start: 2017-08-12 | End: 2017-08-12

## 2017-08-12 RX ORDER — SODIUM CHLORIDE 9 MG/ML
1000 INJECTION INTRAMUSCULAR; INTRAVENOUS; SUBCUTANEOUS
Qty: 0 | Refills: 0 | Status: DISCONTINUED | OUTPATIENT
Start: 2017-08-12 | End: 2017-08-12

## 2017-08-12 RX ADMIN — MEMANTINE HYDROCHLORIDE 10 MILLIGRAM(S): 10 TABLET ORAL at 17:05

## 2017-08-12 RX ADMIN — DONEPEZIL HYDROCHLORIDE 10 MILLIGRAM(S): 10 TABLET, FILM COATED ORAL at 21:31

## 2017-08-12 RX ADMIN — Medication 1000 UNIT(S): at 11:28

## 2017-08-12 RX ADMIN — ENOXAPARIN SODIUM 40 MILLIGRAM(S): 100 INJECTION SUBCUTANEOUS at 11:28

## 2017-08-12 RX ADMIN — CEFTRIAXONE 100 GRAM(S): 500 INJECTION, POWDER, FOR SOLUTION INTRAMUSCULAR; INTRAVENOUS at 22:18

## 2017-08-12 RX ADMIN — SODIUM CHLORIDE 1000 MILLILITER(S): 9 INJECTION INTRAMUSCULAR; INTRAVENOUS; SUBCUTANEOUS at 10:16

## 2017-08-12 RX ADMIN — SIMVASTATIN 20 MILLIGRAM(S): 20 TABLET, FILM COATED ORAL at 21:31

## 2017-08-12 RX ADMIN — SODIUM CHLORIDE 75 MILLILITER(S): 9 INJECTION INTRAMUSCULAR; INTRAVENOUS; SUBCUTANEOUS at 11:28

## 2017-08-12 RX ADMIN — Medication 81 MILLIGRAM(S): at 11:28

## 2017-08-12 RX ADMIN — MEMANTINE HYDROCHLORIDE 10 MILLIGRAM(S): 10 TABLET ORAL at 05:34

## 2017-08-12 NOTE — PROGRESS NOTE ADULT - PROBLEM SELECTOR PLAN 4
Likely due to dehydration 2/2 UTI or could be deconditions from advancing age.   Maintain adequate hydration and nutrition.  f/u Vitamin b12, folate and Vitamin D.  PT consult.  Fall precautions. Likely due to dehydration 2/2 UTI or could be deconditions from advancing age.   c/w IVF  f/u Vitamin b12, folate and Vitamin D.  PT rec HE for d/c planning  Fall precautions.

## 2017-08-12 NOTE — PROGRESS NOTE ADULT - PROBLEM SELECTOR PLAN 5
CT chest form May 2017 showed: Slight interval decrease in size of right middle lobe spiculated nodular opacity, now measuring 2.7 x 1.4 cm (previously 3.0 x 1.6 cm).   Patient denies any cough, sputum production or hemoptysis.   f/u Repeat CT chest. CT chest form May 2017 showed: Slight interval decrease in size of right middle lobe spiculated nodular opacity, now measuring 2.7 x 1.4 cm (previously 3.0 x 1.6 cm).   Patient denies any cough, sputum production or hemoptysis.   Repeat CT chest: Near complete resolution of multiple right lung opacities   which likely represented multifocal pneumonia.

## 2017-08-12 NOTE — PROGRESS NOTE ADULT - PROBLEM SELECTOR PLAN 2
BP in 90s-100s  s/p 500cc bolus today  Monitor BP closely BP in 90s-100s  s/p 500cc bolus today... Consent obtained for central line (in chart) if situation warrants  Monitor BP closely

## 2017-08-12 NOTE — PROGRESS NOTE ADULT - SUBJECTIVE AND OBJECTIVE BOX
PGY 1 Note discussed with supervising resident and primary attending    Patient is a 82y old  Male who presents with a chief complaint of hypotension, unsteady gait, increased fatigue. (11 Aug 2017 15:46)      INTERVAL HPI/OVERNIGHT EVENTS: Pt was seen and examined at bedside. No acute events overnight. Pt offers no new complaints; current symptoms stable.    MEDICATIONS  (STANDING):  donepezil 10 milliGRAM(s) Oral at bedtime  aspirin  chewable 81 milliGRAM(s) Oral daily  memantine 10 milliGRAM(s) Oral two times a day  simvastatin 20 milliGRAM(s) Oral at bedtime  cholecalciferol 1000 Unit(s) Oral daily  cefTRIAXone   IVPB   IV Intermittent   enoxaparin Injectable 40 milliGRAM(s) SubCutaneous daily  cefTRIAXone   IVPB 1 Gram(s) IV Intermittent every 24 hours    MEDICATIONS  (PRN):      __________________________________________________  REVIEW OF SYSTEMS:    CONSTITUTIONAL: No fever, chills, night sweats  EYES: no acute visual disturbances  NECK: No pain or stiffness  RESPIRATORY: No cough; No shortness of breath  CARDIOVASCULAR: No chest pain, no palpitations  GASTROINTESTINAL: No pain. No nausea or vomiting; No diarrhea  NEUROLOGICAL: No headache or numbness, no tremors  MUSCULOSKELETAL: No joint pain, no muscle pain  GENITOURINARY: no dysuria, no frequency, no hesitancy  PSYCHIATRY: no depression , no anxiety  ALL OTHER ROS negative        Vital Signs Last 24 Hrs  T(C): 37.3 (12 Aug 2017 07:40), Max: 37.6 (11 Aug 2017 23:33)  T(F): 99.2 (12 Aug 2017 07:40), Max: 99.6 (11 Aug 2017 23:33)  HR: 97 (12 Aug 2017 07:40) (78 - 101)  BP: 90/62 (12 Aug 2017 07:40) (90/62 - 135/88)  BP(mean): --  RR: 17 (12 Aug 2017 07:40) (16 - 17)  SpO2: 96% (12 Aug 2017 07:40) (96% - 99%)    ________________________________________________  PHYSICAL EXAM:  GENERAL: NAD. Well appearing, well nourished. Normal mood & affect.  HEENT: Normocephalic;  conjunctivae and sclerae clear; moist mucous membranes;   NECK : supple  CHEST/LUNG: Clear to auscultation bilaterally with good air entry   HEART: S1 S2  regular; no murmurs, gallops or rubs  ABDOMEN: Soft, Nontender, Nondistended; Bowel sounds present  EXTREMITIES: no cyanosis; no edema; no calf tenderness; peripheral pulses intact  SKIN: warm and dry; no rash  NERVOUS SYSTEM:  Awake and alert;     _________________________________________________  LABS:                        14.4   21.3  )-----------( 221      ( 12 Aug 2017 06:30 )             44.0     08-    141  |  105  |  19<H>  ----------------------------<  99  4.1   |  25  |  1.20    Ca    8.4      12 Aug 2017 06:30  Phos  3.0     08-  Mg     2.4     -    TPro  7.1  /  Alb  2.8<L>  /  TBili  1.8<H>  /  DBili  x   /  AST  42<H>  /  ALT  41  /  AlkPhos  106  08-11      Urinalysis Basic - ( 11 Aug 2017 14:40 )    Color: Yellow / Appearance: x / S.020 / pH: x  Gluc: x / Ketone: Small  / Bili: Negative / Urobili: Negative   Blood: x / Protein: 30 mg/dL / Nitrite: Negative   Leuk Esterase: Moderate / RBC: 2-5 /HPF / WBC 26-50 /HPF   Sq Epi: x / Non Sq Epi: Few / Bacteria: Moderate /HPF        Consultant(s) Notes Reviewed:   YES    Care Discussed with PGY-2/Attending/Consultants :  YES    Plan of care was discussed with patient and /or primary care giver; all questions and concerns were addressed and care was aligned with patient's wishes. PGY 1 Note discussed with supervising resident and primary attending    Patient is a 82y old  Male who presents with a chief complaint of hypotension, unsteady gait, increased fatigue. (11 Aug 2017 15:46)      INTERVAL HPI/OVERNIGHT EVENTS: Pt was seen and examined at bedside. No acute events overnight. Pt offers no new complaints; current symptoms stable.    MEDICATIONS  (STANDING):  donepezil 10 milliGRAM(s) Oral at bedtime  aspirin  chewable 81 milliGRAM(s) Oral daily  memantine 10 milliGRAM(s) Oral two times a day  simvastatin 20 milliGRAM(s) Oral at bedtime  cholecalciferol 1000 Unit(s) Oral daily  cefTRIAXone   IVPB   IV Intermittent   enoxaparin Injectable 40 milliGRAM(s) SubCutaneous daily  cefTRIAXone   IVPB 1 Gram(s) IV Intermittent every 24 hours      __________________________________________________  REVIEW OF SYSTEMS:    CONSTITUTIONAL: No fever, chills, night sweats  EYES: no acute visual disturbances  NECK: No pain or stiffness  RESPIRATORY: No cough; No shortness of breath  CARDIOVASCULAR: No chest pain, no palpitations  GASTROINTESTINAL: No pain. No nausea or vomiting; No diarrhea  NEUROLOGICAL: No headache or numbness, no tremors  MUSCULOSKELETAL: No joint pain, no muscle pain  GENITOURINARY: no dysuria, no frequency, no hesitancy  PSYCHIATRY: no depression , no anxiety  ALL OTHER ROS negative        Vital Signs Last 24 Hrs  T(C): 37.3 (12 Aug 2017 07:40), Max: 37.6 (11 Aug 2017 23:33)  T(F): 99.2 (12 Aug 2017 07:40), Max: 99.6 (11 Aug 2017 23:33)  HR: 97 (12 Aug 2017 07:40) (78 - 101)  BP: 90/62 (12 Aug 2017 07:40) (90/62 - 135/88)  RR: 17 (12 Aug 2017 07:40) (16 - 17)  SpO2: 96% (12 Aug 2017 07:40) (96% - 99%)    ________________________________________________  PHYSICAL EXAM:  GENERAL: NAD. Well appearing, well nourished. Normal mood & affect.  HEENT: Normocephalic;  conjunctivae and sclerae clear; moist mucous membranes;   NECK : supple  CHEST/LUNG: Clear to auscultation bilaterally with good air entry   HEART: S1 S2  regular; no murmurs, gallops or rubs  ABDOMEN: Soft, Nontender, Nondistended; Bowel sounds present  EXTREMITIES: no cyanosis; no edema; no calf tenderness; peripheral pulses intact  SKIN: warm and dry; no rash  NERVOUS SYSTEM:  Awake and alert;     _________________________________________________  LABS:                        14.4   21.3  )-----------( 221      ( 12 Aug 2017 06:30 )             44.0     08-12    141  |  105  |  19<H>  ----------------------------<  99  4.1   |  25  |  1.20    Ca    8.4      12 Aug 2017 06:30  Phos  3.0     08-12  Mg     2.4     08-12    TPro  7.1  /  Alb  2.8<L>  /  TBili  1.8<H>  /  DBili  x   /  AST  42<H>  /  ALT  41  /  AlkPhos  106  08-11      Urinalysis Basic - ( 11 Aug 2017 14:40 )    Color: Yellow / Appearance: x / S.020 / pH: x  Gluc: x / Ketone: Small  / Bili: Negative / Urobili: Negative   Blood: x / Protein: 30 mg/dL / Nitrite: Negative   Leuk Esterase: Moderate / RBC: 2-5 /HPF / WBC 26-50 /HPF   Sq Epi: x / Non Sq Epi: Few / Bacteria: Moderate /HPF        Consultant(s) Notes Reviewed:   YES    Care Discussed with PGY-2/Attending/Consultants :  YES    Plan of care was discussed with patient and /or primary care giver; all questions and concerns were addressed and care was aligned with patient's wishes. PGY 1 Note discussed with supervising resident and primary attending    Patient is a 82y old  Male who presents with a chief complaint of hypotension, unsteady gait, increased fatigue. (11 Aug 2017 15:46)      INTERVAL HPI/OVERNIGHT EVENTS: Pt was seen and examined at bedside. No acute events overnight. Pt offers no new complaints; current symptoms stable.    MEDICATIONS  (STANDING):  donepezil 10 milliGRAM(s) Oral at bedtime  aspirin  chewable 81 milliGRAM(s) Oral daily  memantine 10 milliGRAM(s) Oral two times a day  simvastatin 20 milliGRAM(s) Oral at bedtime  cholecalciferol 1000 Unit(s) Oral daily  cefTRIAXone   IVPB   IV Intermittent   enoxaparin Injectable 40 milliGRAM(s) SubCutaneous daily  cefTRIAXone   IVPB 1 Gram(s) IV Intermittent every 24 hours      __________________________________________________  REVIEW OF SYSTEMS:    CONSTITUTIONAL: No fever, chills, night sweats  RESPIRATORY: No cough; No shortness of breath  CARDIOVASCULAR: No chest pain, no palpitations  GASTROINTESTINAL: No pain. No nausea or vomiting; No diarrhea        Vital Signs Last 24 Hrs  T(C): 37.3 (12 Aug 2017 07:40), Max: 37.6 (11 Aug 2017 23:33)  T(F): 99.2 (12 Aug 2017 07:40), Max: 99.6 (11 Aug 2017 23:33)  HR: 97 (12 Aug 2017 07:40) (78 - 101)  BP: 90/62 (12 Aug 2017 07:40) (90/62 - 135/88)  RR: 17 (12 Aug 2017 07:40) (16 - 17)  SpO2: 96% (12 Aug 2017 07:40) (96% - 99%)    ________________________________________________  PHYSICAL EXAM:  GENERAL: NAD. Well appearing, well nourished. Normal mood & affect.  HEENT: Normocephalic;  conjunctivae and sclerae clear; moist mucous membranes;   NECK : supple  CHEST/LUNG: Clear to auscultation bilaterally with good air entry   HEART: S1 S2  regular; no murmurs, gallops or rubs  ABDOMEN: Soft, Nontender, Nondistended; Bowel sounds present  EXTREMITIES: no cyanosis; no edema; no calf tenderness; peripheral pulses intact  SKIN: warm and dry; no rash  NERVOUS SYSTEM:  Awake and alert    _________________________________________________  LABS:                        14.4   21.3  )-----------( 221      ( 12 Aug 2017 06:30 )             44.0     08-12    141  |  105  |  19<H>  ----------------------------<  99  4.1   |  25  |  1.20    Ca    8.4      12 Aug 2017 06:30  Phos  3.0     08-12  Mg     2.4     08-12    TPro  7.1  /  Alb  2.8<L>  /  TBili  1.8<H>  /  DBili  x   /  AST  42<H>  /  ALT  41  /  AlkPhos  106  08-11      Urinalysis Basic - ( 11 Aug 2017 14:40 )    Color: Yellow / Appearance: x / S.020 / pH: x  Gluc: x / Ketone: Small  / Bili: Negative / Urobili: Negative   Blood: x / Protein: 30 mg/dL / Nitrite: Negative   Leuk Esterase: Moderate / RBC: 2-5 /HPF / WBC 26-50 /HPF   Sq Epi: x / Non Sq Epi: Few / Bacteria: Moderate /HPF      CT Chest no cont:  IMPRESSION: Near complete resolution of multiple right lung opacities   which likely represented multifocal pneumonia. Residual opacities may   represent scarring, however, one more follow-up CT would be prudent to  ensure complete resolution or at least no interval growth.    Xray Chest:  IMPRESSION:    No focal lung consolidation. Spiculated nodular densities described on CT   the chest from May 16, 2017 are not well demonstrated on plain radiograph.    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    Consultant(s) Notes Reviewed:   YES    Care Discussed with PGY-2/Attending/Consultants :  YES    Plan of care was discussed with patient and /or primary care giver; all questions and concerns were addressed and care was aligned with patient's wishes.

## 2017-08-12 NOTE — PROGRESS NOTE ADULT - PROBLEM SELECTOR PLAN 6
Continue home meds, Namenda and Aricept.  Fall precautions. Stable  c/w home meds -Namenda and Aricept.  Fall precautions.

## 2017-08-12 NOTE — PROGRESS NOTE ADULT - PROBLEM SELECTOR PLAN 1
UA positive for wbc of 26-50, moderate Leukocyte esterase.  c/w Rocephin 1g daily   f/u UCx, BCx UA positive for wbc of 26-50, moderate leuk est.  c/w Rocephin 1g daily   f/u UCx, BCx

## 2017-08-12 NOTE — PROGRESS NOTE ADULT - ATTENDING COMMENTS
Patient seen and examined; Agree with PGY1 A/P above with editing as needed. Close friends at bedside    Patient comfortable, denies new complaints, appetite poor; No chest pain or abdominal pain; No fever/ sob/ cough;     Vitals stable except low BP     P/E: As above    labs: reviewed; stable    CT Chest No Cont (08.12.17 @ 09:35) >    IMPRESSION: Near complete resolution of multiple right lung opacities which likely represented multifocal pneumonia. Residual opacities may   represent scarring, however, one more follow-up CT would be prudent to ensure complete resolution or at least no interval growth.\      D/D  Hypotension likely due to dehydration and underlying UTI  Gait imbalance partly infection/ dehydration and Dementia related  SAM due to dehydration resolved  Dementia        Plan:  Continue IV Ceftriaxone  F/U Urin Cx  Monitor BP closely; Continue IVF; Bolus as needed.  Prior Spiculated Pneumonia has almost resolved on repeat CT Chest  Continue Namenda/ Aricept/ DVT ppx    d/w PGY 1 Dr. Hearn plan of care; d/w RN

## 2017-08-12 NOTE — PROGRESS NOTE ADULT - ASSESSMENT
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 In ED, upon admission T of 99.2, HR of 79, /78, RR 16, spO2 98%. EKG showed NSR. CXR : No focal lung consolidation. 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 Labs significant for elevated wbc of 21.5, Lactate of 2.4, UA positive for wbc of 26-50, moderate Leukocyte esterase. 1 dose of Maxipime and 3 L bolus given in ED. Patient admitted to medicine floor for further management. 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 1 dose of Maxipime and 3 L bolus given in ED. Pt admitted to medicine floor for management of UTI and hypotension.

## 2017-08-12 NOTE — PHYSICAL THERAPY INITIAL EVALUATION ADULT - CRITERIA FOR SKILLED THERAPEUTIC INTERVENTIONS
risk reduction/prevention/functional limitations in following categories/impairments found/rehab potential/anticipated discharge recommendation

## 2017-08-12 NOTE — PHYSICAL THERAPY INITIAL EVALUATION ADULT - DISCHARGE DISPOSITION, PT EVAL
rehabilitation facility/Patient presented with poor safety awareness and unsteady gait pattern  and would benefit from short term rehab to maximize functional mobility .

## 2017-08-12 NOTE — PHYSICAL THERAPY INITIAL EVALUATION ADULT - PLANNED THERAPY INTERVENTIONS, PT EVAL
strengthening/transfer training/bed mobility training/neuromuscular re-education/gait training/balance training

## 2017-08-13 DIAGNOSIS — Z71.89 OTHER SPECIFIED COUNSELING: ICD-10-CM

## 2017-08-13 LAB
ANION GAP SERPL CALC-SCNC: 8 MMOL/L — SIGNIFICANT CHANGE UP (ref 5–17)
BASOPHILS NFR BLD AUTO: 1 % — SIGNIFICANT CHANGE UP (ref 0–2)
BUN SERPL-MCNC: 19 MG/DL — HIGH (ref 7–18)
CALCIUM SERPL-MCNC: 8.3 MG/DL — LOW (ref 8.4–10.5)
CHLORIDE SERPL-SCNC: 104 MMOL/L — SIGNIFICANT CHANGE UP (ref 96–108)
CO2 SERPL-SCNC: 26 MMOL/L — SIGNIFICANT CHANGE UP (ref 22–31)
CORTIS AM PEAK SERPL-MCNC: 20.8 UG/DL — HIGH (ref 6–18.4)
CREAT SERPL-MCNC: 1.2 MG/DL — SIGNIFICANT CHANGE UP (ref 0.5–1.3)
CULTURE RESULTS: NO GROWTH — SIGNIFICANT CHANGE UP
GLUCOSE SERPL-MCNC: 109 MG/DL — HIGH (ref 70–99)
HCT VFR BLD CALC: 41.9 % — SIGNIFICANT CHANGE UP (ref 39–50)
HGB BLD-MCNC: 13.7 G/DL — SIGNIFICANT CHANGE UP (ref 13–17)
LYMPHOCYTES # BLD AUTO: 10 % — LOW (ref 13–44)
MCHC RBC-ENTMCNC: 31 PG — SIGNIFICANT CHANGE UP (ref 27–34)
MCHC RBC-ENTMCNC: 32.7 GM/DL — SIGNIFICANT CHANGE UP (ref 32–36)
MCV RBC AUTO: 94.8 FL — SIGNIFICANT CHANGE UP (ref 80–100)
MONOCYTES NFR BLD AUTO: 6 % — SIGNIFICANT CHANGE UP (ref 2–14)
NEUTROPHILS NFR BLD AUTO: 82 % — HIGH (ref 43–77)
PLATELET # BLD AUTO: 252 K/UL — SIGNIFICANT CHANGE UP (ref 150–400)
POTASSIUM SERPL-MCNC: 4 MMOL/L — SIGNIFICANT CHANGE UP (ref 3.5–5.3)
POTASSIUM SERPL-SCNC: 4 MMOL/L — SIGNIFICANT CHANGE UP (ref 3.5–5.3)
RBC # BLD: 4.42 M/UL — SIGNIFICANT CHANGE UP (ref 4.2–5.8)
RBC # FLD: 13.2 % — SIGNIFICANT CHANGE UP (ref 10.3–14.5)
SODIUM SERPL-SCNC: 138 MMOL/L — SIGNIFICANT CHANGE UP (ref 135–145)
SPECIMEN SOURCE: SIGNIFICANT CHANGE UP
WBC # BLD: 24.9 K/UL — HIGH (ref 3.8–10.5)
WBC # FLD AUTO: 24.9 K/UL — HIGH (ref 3.8–10.5)

## 2017-08-13 PROCEDURE — 71010: CPT | Mod: 26

## 2017-08-13 RX ORDER — SODIUM CHLORIDE 9 MG/ML
500 INJECTION INTRAMUSCULAR; INTRAVENOUS; SUBCUTANEOUS ONCE
Qty: 0 | Refills: 0 | Status: COMPLETED | OUTPATIENT
Start: 2017-08-13 | End: 2017-08-13

## 2017-08-13 RX ORDER — SODIUM CHLORIDE 9 MG/ML
1000 INJECTION INTRAMUSCULAR; INTRAVENOUS; SUBCUTANEOUS
Qty: 0 | Refills: 0 | Status: DISCONTINUED | OUTPATIENT
Start: 2017-08-13 | End: 2017-08-17

## 2017-08-13 RX ORDER — DONEPEZIL HYDROCHLORIDE 10 MG/1
5 TABLET, FILM COATED ORAL AT BEDTIME
Qty: 0 | Refills: 0 | Status: DISCONTINUED | OUTPATIENT
Start: 2017-08-13 | End: 2017-08-18

## 2017-08-13 RX ORDER — SODIUM CHLORIDE 9 MG/ML
500 INJECTION INTRAMUSCULAR; INTRAVENOUS; SUBCUTANEOUS ONCE
Qty: 0 | Refills: 0 | Status: DISCONTINUED | OUTPATIENT
Start: 2017-08-13 | End: 2017-08-13

## 2017-08-13 RX ORDER — MEMANTINE HYDROCHLORIDE 10 MG/1
5 TABLET ORAL
Qty: 0 | Refills: 0 | Status: DISCONTINUED | OUTPATIENT
Start: 2017-08-13 | End: 2017-08-18

## 2017-08-13 RX ORDER — SODIUM CHLORIDE 9 MG/ML
50 INJECTION INTRAMUSCULAR; INTRAVENOUS; SUBCUTANEOUS ONCE
Qty: 0 | Refills: 0 | Status: DISCONTINUED | OUTPATIENT
Start: 2017-08-13 | End: 2017-08-13

## 2017-08-13 RX ORDER — MIDODRINE HYDROCHLORIDE 2.5 MG/1
10 TABLET ORAL EVERY 8 HOURS
Qty: 0 | Refills: 0 | Status: DISCONTINUED | OUTPATIENT
Start: 2017-08-13 | End: 2017-08-13

## 2017-08-13 RX ADMIN — MIDODRINE HYDROCHLORIDE 10 MILLIGRAM(S): 2.5 TABLET ORAL at 10:24

## 2017-08-13 RX ADMIN — CEFTRIAXONE 100 GRAM(S): 500 INJECTION, POWDER, FOR SOLUTION INTRAMUSCULAR; INTRAVENOUS at 21:31

## 2017-08-13 RX ADMIN — SODIUM CHLORIDE 1000 MILLILITER(S): 9 INJECTION INTRAMUSCULAR; INTRAVENOUS; SUBCUTANEOUS at 08:32

## 2017-08-13 RX ADMIN — DONEPEZIL HYDROCHLORIDE 5 MILLIGRAM(S): 10 TABLET, FILM COATED ORAL at 21:31

## 2017-08-13 RX ADMIN — Medication 81 MILLIGRAM(S): at 11:43

## 2017-08-13 RX ADMIN — MEMANTINE HYDROCHLORIDE 10 MILLIGRAM(S): 10 TABLET ORAL at 05:22

## 2017-08-13 RX ADMIN — MEMANTINE HYDROCHLORIDE 5 MILLIGRAM(S): 10 TABLET ORAL at 17:07

## 2017-08-13 RX ADMIN — SODIUM CHLORIDE 75 MILLILITER(S): 9 INJECTION INTRAMUSCULAR; INTRAVENOUS; SUBCUTANEOUS at 10:24

## 2017-08-13 RX ADMIN — Medication 1000 UNIT(S): at 11:43

## 2017-08-13 RX ADMIN — ENOXAPARIN SODIUM 40 MILLIGRAM(S): 100 INJECTION SUBCUTANEOUS at 11:43

## 2017-08-13 RX ADMIN — SIMVASTATIN 20 MILLIGRAM(S): 20 TABLET, FILM COATED ORAL at 21:31

## 2017-08-13 NOTE — PROGRESS NOTE ADULT - SUBJECTIVE AND OBJECTIVE BOX
MEDICAL ATTENDING NOTE  Patient is a 82y old  Male who presents with a chief complaint of hypotension, unsteady gait, increased fatigue. (11 Aug 2017 15:46)      INTERVAL HPI/OVERNIGHT EVENTS: no new complaints    MEDICATIONS  (STANDING):  aspirin  chewable 81 milliGRAM(s) Oral daily  simvastatin 20 milliGRAM(s) Oral at bedtime  cholecalciferol 1000 Unit(s) Oral daily  cefTRIAXone   IVPB   IV Intermittent   enoxaparin Injectable 40 milliGRAM(s) SubCutaneous daily  cefTRIAXone   IVPB 1 Gram(s) IV Intermittent every 24 hours  sodium chloride 0.9%. 1000 milliLiter(s) (75 mL/Hr) IV Continuous <Continuous>  donepezil 5 milliGRAM(s) Oral at bedtime  memantine 5 milliGRAM(s) Oral two times a day    MEDICATIONS  (PRN):      __________________________________________________  REVIEW OF SYSTEMS:    CONSTITUTIONAL: No fever,   EYES: no acute visual disturbances  NECK: No pain or stiffness  RESPIRATORY: No cough; No shortness of breath  CARDIOVASCULAR: No chest pain, no palpitations  GASTROINTESTINAL: No pain. No nausea or vomiting; No diarrhea   NEUROLOGICAL: No headache or numbness, no tremors  MUSCULOSKELETAL: No joint pain, no muscle pain  GENITOURINARY: no dysuria, no frequency, no hesitancy  PSYCHIATRY: no depression , no anxiety  ALL OTHER  ROS negative        Vital Signs Last 24 Hrs  T(C): 37.2 (13 Aug 2017 09:29), Max: 37.2 (13 Aug 2017 09:29)  T(F): 98.9 (13 Aug 2017 09:29), Max: 98.9 (13 Aug 2017 09:29)  HR: 78 (13 Aug 2017 09:29) (57 - 113)  BP: 93/65 (13 Aug 2017 09:29) (85/55 - 95/65)  BP(mean): --  RR: 17 (13 Aug 2017 09:29) (16 - 17)  SpO2: 98% (13 Aug 2017 09:29) (95% - 98%)    ________________________________________________  PHYSICAL EXAM:  GENERAL: NAD  HEENT: Normocephalic;  conjunctivae and sclerae clear; moist mucous membranes;   NECK : supple  CHEST/LUNG: Clear to auscultation bilaterally with good air entry   HEART: S1 S2  regular; no murmurs, gallops or rubs  ABDOMEN: Soft, Nontender, Nondistended; Bowel sounds present  EXTREMITIES: no cyanosis; no edema; no calf tenderness  SKIN: warm and dry; no rash  NERVOUS SYSTEM:  Awake and alert; Oriented  to place, person and time ; no new deficits    _________________________________________________  LABS:                        13.7   24.9  )-----------( 252      ( 13 Aug 2017 06:23 )             41.9     08-13    138  |  104  |  19<H>  ----------------------------<  109<H>  4.0   |  26  |  1.20    Ca    8.3<L>      13 Aug 2017 06:23  Phos  3.0     08-12  Mg     2.4     08-12      PT/INR - ( 12 Aug 2017 15:14 )   PT: 18.8 sec;   INR: 1.71 ratio         PTT - ( 12 Aug 2017 15:14 )  PTT:39.8 sec  Urinalysis Basic - ( 11 Aug 2017 14:40 )    Color: Yellow / Appearance: x / S.020 / pH: x  Gluc: x / Ketone: Small  / Bili: Negative / Urobili: Negative   Blood: x / Protein: 30 mg/dL / Nitrite: Negative   Leuk Esterase: Moderate / RBC: 2-5 /HPF / WBC 26-50 /HPF   Sq Epi: x / Non Sq Epi: Few / Bacteria: Moderate /HPF      CAPILLARY BLOOD GLUCOSE            RADIOLOGY & ADDITIONAL TESTS:    Imaging Personally Reviewed:  YES/NO    Consultant(s) Notes Reviewed:   YES/ No    Care Discussed with Consultants :     Plan of care was discussed with patient and /or primary care giver; all questions and concerns were addressed and care was aligned with patient's wishes. MEDICAL ATTENDING NOTE  Patient is a 82y old  Male who presents with a chief complaint of hypotension, unsteady gait, increased fatigue. (11 Aug 2017 15:46)      INTERVAL HPI/OVERNIGHT EVENTS: no new complaints; BP persistently in the lower end 90's; Drowsy but easily arousable; Family (Son from [place]) at bedside; As per son, at baseline ambulation is limited; Patient also has Hx Falls and hypotension as per Son.     MEDICATIONS  (STANDING):  aspirin  chewable 81 milliGRAM(s) Oral daily  simvastatin 20 milliGRAM(s) Oral at bedtime  cholecalciferol 1000 Unit(s) Oral daily  cefTRIAXone   IVPB   IV Intermittent   enoxaparin Injectable 40 milliGRAM(s) SubCutaneous daily  cefTRIAXone   IVPB 1 Gram(s) IV Intermittent every 24 hours  sodium chloride 0.9%. 1000 milliLiter(s) (75 mL/Hr) IV Continuous <Continuous>  donepezil 5 milliGRAM(s) Oral at bedtime  memantine 5 milliGRAM(s) Oral two times a day      __________________________________________________  REVIEW OF SYSTEMS:    CONSTITUTIONAL: No fever,   NECK: No pain or stiffness  RESPIRATORY: No cough; No shortness of breath  CARDIOVASCULAR: No chest pain, no palpitations  GASTROINTESTINAL: No pain. No nausea or vomiting; No diarrhea   NEUROLOGICAL: No headache or numbness, no tremors  MUSCULOSKELETAL: No joint pain, no muscle pain  GENITOURINARY: no dysuria, no frequency, no hesitancy  PSYCHIATRY: has dementia  ALL OTHER  ROS negative        Vital Signs Last 24 Hrs  T(C): 37.2 (13 Aug 2017 09:29), Max: 37.2 (13 Aug 2017 09:29)  T(F): 98.9 (13 Aug 2017 09:29), Max: 98.9 (13 Aug 2017 09:29)  HR: 78 (13 Aug 2017 09:29) (57 - 113)  BP: 93/65 (13 Aug 2017 09:29) (85/55 - 95/65)  RR: 17 (13 Aug 2017 09:29) (16 - 17)  SpO2: 98% (13 Aug 2017 09:29) (95% - 98%)    ________________________________________________  PHYSICAL EXAM:  GENERAL: NAD  HEENT: Normocephalic;  conjunctivae and sclerae clear; moist mucous membranes;   NECK : supple  CHEST/LUNG: Clear to auscultation bilaterally with good air entry   HEART: S1 S2  regular; no murmurs, gallops or rubs  ABDOMEN: Soft, Nontender, Nondistended; Bowel sounds present  EXTREMITIES: no cyanosis; no edema; no calf tenderness  SKIN: warm and dry; no rash  NERVOUS SYSTEM: Follows commands; Limited exam;   _________________________________________________  LABS:                        13.7   24.9  )-----------( 252      ( 13 Aug 2017 06:23 )             41.9     08-13    138  |  104  |  19<H>  ----------------------------<  109<H>  4.0   |  26  |  1.20    Ca    8.3<L>      13 Aug 2017 06:23  Phos  3.0     08-12  Mg     2.4     08-12      PT/INR - ( 12 Aug 2017 15:14 )   PT: 18.8 sec;   INR: 1.71 ratio         PTT - ( 12 Aug 2017 15:14 )  PTT:39.8 sec      RADIOLOGY & ADDITIONAL TESTS: No new      Consultant(s) Notes Reviewed:   YES; Discussed with ID      Plan of care was discussed with patient and /or primary care giver; all questions and concerns were addressed and care was aligned with patient's wishes.

## 2017-08-13 NOTE — PROGRESS NOTE ADULT - PROBLEM SELECTOR PLAN 4
Likely due to dehydration 2/2 UTI or could be deconditions from advancing age.   c/w IVF  f/u Vitamin b12, folate and Vitamin D which is WNL  PT rec HE for d/c planning  Fall precautions.

## 2017-08-13 NOTE — PROGRESS NOTE ADULT - PROBLEM SELECTOR PLAN 1
c/w Rocephin 1g daily;f/u UCx, BCx  Leucocytosis trending up concerninf for Resistant organism;   Uirne Cx collected after a dose of Cefepime; Discussed with ID dr. Gong to see if need to switch to Broad spectrum coverage to cover resistant organism to ceftriaxone.

## 2017-08-13 NOTE — PROGRESS NOTE ADULT - ASSESSMENT
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 1 dose of Maxipime and 3 L bolus given in ED. Pt admitted to medicine floor for management of UTI and hypotension.

## 2017-08-13 NOTE — CHART NOTE - NSCHARTNOTEFT_GEN_A_CORE
Blood pressure 92/66 at 7:30 am. Ordered repeat vitals. Started patient on 75ml/hr NS. Added midodrine 10 mg Q8 hrs for low blood pressure. Discussed with Dr Gallego PGY3. Blood pressure 92/66 at 7:30 am. repeat vitals 93//65. 500 NS bolus given. Started patient on 75ml/hr NS. Added midodrine 10 mg Q8 hrs for low blood pressure. Serum cortisol am ordered for tomorrow morning. Discussed with Dr Gallego PGY3.

## 2017-08-13 NOTE — PROGRESS NOTE ADULT - PROBLEM SELECTOR PLAN 8
Spoke with Son at bedside and wife over the phone reviewed Patient prior expressed wishes. She has Living will and confirms Patient is DNR status.

## 2017-08-13 NOTE — PROGRESS NOTE ADULT - ATTENDING COMMENTS
Follow up ID consult  Discussed with CHAPITO Johnson; Reviewed Advance directives; She will sign off DNR consent in chart.  Patient was given Midodrine by housestaff; d/w PGY3 on call; will hold off Midodrine and will resume if no infectious etiology found.

## 2017-08-13 NOTE — PROGRESS NOTE ADULT - PROBLEM SELECTOR PLAN 2
BP in 90s-100s; Monitor BP closely  Patient given bolus earlier today; Continue maintenance IVF; Patient has poor oral intake.

## 2017-08-13 NOTE — PROGRESS NOTE ADULT - PROBLEM SELECTOR PLAN 6
Stable; Discussed with wife, Patient has been on Aricept and Namenda for about 5 yrs; Given persistent low BP, will cut back Aricept and Namenda ;  Discussed with Son at bedside and wife over the phone; Agreed.

## 2017-08-13 NOTE — CONSULT NOTE ADULT - SUBJECTIVE AND OBJECTIVE BOX
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 Patient reported that he missed 2 steps while coming out of apartment building and fell down on his left side, got up by himself , was complaining to have bruise on left arm. Denies any chest pain, sob, palpitations, abdominal pain, nausea, vomiting, headache, fever, recent travel. Denies any changes in BM. Denies any urinary complains including burning, dysuria or frequency. Called wife who provided a detailed history. Per wife, patient did not had any fall, was having unsteady gait and increased fatigue and has been sleeping more lately. Wife took patient to PCP's office and was found to have BP in 80s. So PCP recommended to come to ED for further evaluation. 911 was called and patient was brought to ED.   In ED, upon admission T of 99.2, HR of 79, /78, RR 16, spO2 98%. EKG showed NSR. CXR : No focal lung consolidation. 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 Labs significant for elevated wbc of 21.5, Lactate of 2.4, UA positive for wbc of 26-50, moderate Leukocyte esterase. 1 dose of Maxipime and 3 L bolus given in ED. Patient admitted to medicine floor for further management. (11 Aug 2017 15:46)      PAST MEDICAL & SURGICAL HISTORY:  CAD (coronary artery disease): no obstructive  Lung nodule: right  Primary osteoarthritis of both hips  COPD (chronic obstructive pulmonary disease)  Cigarette nicotine dependence  Cataract: bilateral eyes  CKD (chronic kidney disease), stage 3 (moderate)  UTI (urinary tract infection)  Syncope: and near syncope  Vitamin D deficiency  AAA (abdominal aortic aneurysm)  HLD (hyperlipidemia)  BPH (benign prostatic hyperplasia)  Alzheimer disease  Hypercholesteremia  History of wrist fracture: s/p sx of right- 2006  Benign bladder tumor: s/p TURPT- 2006, July 2016  Abdominal aneurysm: repair- 2003  S/P hip replacement, bilateral: 2004, 2005  Bladder cancer: low grade  S/P wrist surgery  AAA (abdominal aortic aneurysm): treated surgically in 2007      No Known Allergies      Meds:  aspirin  chewable 81 milliGRAM(s) Oral daily  simvastatin 20 milliGRAM(s) Oral at bedtime  cholecalciferol 1000 Unit(s) Oral daily  cefTRIAXone   IVPB   IV Intermittent   enoxaparin Injectable 40 milliGRAM(s) SubCutaneous daily  cefTRIAXone   IVPB 1 Gram(s) IV Intermittent every 24 hours  sodium chloride 0.9%. 1000 milliLiter(s) IV Continuous <Continuous>  donepezil 5 milliGRAM(s) Oral at bedtime  memantine 5 milliGRAM(s) Oral two times a day      SOCIAL HISTORY:  Smoker:  YES / NO        PACK YEARS:                         WHEN QUIT?  ETOH use:  YES / NO               FREQUENCY / QUANTITY:  Ilicit Drug use:  YES / NO  Occupation:  Assisted device use (Cane / Walker):  Live with:    FAMILY HISTORY:      VITALS:  Vital Signs Last 24 Hrs  T(C): 37.7 (13 Aug 2017 15:03), Max: 37.7 (13 Aug 2017 15:03)  T(F): 99.9 (13 Aug 2017 15:03), Max: 99.9 (13 Aug 2017 15:03)  HR: 112 (13 Aug 2017 15:03) (57 - 113)  BP: 94/63 (13 Aug 2017 15:03) (85/55 - 94/63)  BP(mean): --  RR: 16 (13 Aug 2017 15:03) (16 - 17)  SpO2: 95% (13 Aug 2017 15:03) (95% - 98%)    LABS/DIAGNOSTIC TESTS:                          13.7   24.9  )-----------( 252      ( 13 Aug 2017 06:23 )             41.9     WBC Count: 24.9 K/uL (08-13 @ 06:23)  WBC Count: 21.3 K/uL (08-12 @ 06:30)  WBC Count: 21.5 K/uL (08-11 @ 13:09)      08-13    138  |  104  |  19<H>  ----------------------------<  109<H>  4.0   |  26  |  1.20    Ca    8.3<L>      13 Aug 2017 06:23  Phos  3.0     08-12  Mg     2.4     08-12                PT/INR - ( 12 Aug 2017 15:14 )   PT: 18.8 sec;   INR: 1.71 ratio         PTT - ( 12 Aug 2017 15:14 )  PTT:39.8 sec    LACTATE:    ABG -     CULTURES:       RADIOLOGY:      ROS  [  ] UNABLE TO ELICIT HPI:  82 years old male from home, AAO x 3 but very poor historian, with PMH of Alzheimer's dementia, AAA (stable) , BPH, CKD, COPD, HLD, and Osteoarthritis and PSHx of Bladder Tumor (s/p resection in May 2017) brought in by wife to ED for c/o hypotension, increased fatigue and weakness. Denies any chest pain, sob, palpitations, abdominal pain, nausea, vomiting, headache, fever, recent travel. Denies any changes in BM. Denies any urinary complains including burning, dysuria or frequency. Called wife who provided a detailed history.  Wife took patient to PCP's office and was found to have BP in 80s. So PCP recommended to come to ED for further evaluation. 911 was called and patient was brought to ED.    Labs significant for elevated wbc of 21.5, Lactate of 2.4, UA positive for wbc of 26-50, moderate Leukocyte esterase. 1 dose of Maxipime prior to urine culture being sent.      PAST MEDICAL & SURGICAL HISTORY:  CAD (coronary artery disease): no obstructive  Lung nodule: right  Primary osteoarthritis of both hips  COPD (chronic obstructive pulmonary disease)  Cigarette nicotine dependence  Cataract: bilateral eyes  CKD (chronic kidney disease), stage 3 (moderate)  UTI (urinary tract infection)  Syncope: and near syncope  Vitamin D deficiency  AAA (abdominal aortic aneurysm)  HLD (hyperlipidemia)  BPH (benign prostatic hyperplasia)  Alzheimer disease  Hypercholesteremia  History of wrist fracture: s/p sx of right- 2006  Benign bladder tumor: s/p TURPT- 2006, July 2016  Abdominal aneurysm: repair- 2003  S/P hip replacement, bilateral: 2004, 2005  Bladder cancer: low grade  S/P wrist surgery  AAA (abdominal aortic aneurysm): treated surgically in 2007      No Known Allergies      Meds:  aspirin  chewable 81 milliGRAM(s) Oral daily  simvastatin 20 milliGRAM(s) Oral at bedtime  cholecalciferol 1000 Unit(s) Oral daily  cefTRIAXone   IVPB   IV Intermittent   enoxaparin Injectable 40 milliGRAM(s) SubCutaneous daily  cefTRIAXone   IVPB 1 Gram(s) IV Intermittent every 24 hours  sodium chloride 0.9%. 1000 milliLiter(s) IV Continuous <Continuous>  donepezil 5 milliGRAM(s) Oral at bedtime  memantine 5 milliGRAM(s) Oral two times a day      SOCIAL HISTORY:  Smoker:    ETOH use:          FAMILY HISTORY:      VITALS:  Vital Signs Last 24 Hrs  T(C): 37.7 (13 Aug 2017 15:03), Max: 37.7 (13 Aug 2017 15:03)  T(F): 99.9 (13 Aug 2017 15:03), Max: 99.9 (13 Aug 2017 15:03)  HR: 112 (13 Aug 2017 15:03) (57 - 113)  BP: 94/63 (13 Aug 2017 15:03) (85/55 - 94/63)  BP(mean): --  RR: 16 (13 Aug 2017 15:03) (16 - 17)  SpO2: 95% (13 Aug 2017 15:03) (95% - 98%)    LABS/DIAGNOSTIC TESTS:                          13.7   24.9  )-----------( 252      ( 13 Aug 2017 06:23 )             41.9     WBC Count: 24.9 K/uL (08-13 @ 06:23)  WBC Count: 21.3 K/uL (08-12 @ 06:30)  WBC Count: 21.5 K/uL (08-11 @ 13:09)      08-13    138  |  104  |  19<H>  ----------------------------<  109<H>  4.0   |  26  |  1.20    Ca    8.3<L>      13 Aug 2017 06:23  Phos  3.0     08-12  Mg     2.4     08-12          Culture - Blood (08.12.17 @ 01:41)    Specimen Source: .Blood Blood-Peripheral    Culture Results:   No growth to date.          PT/INR - ( 12 Aug 2017 15:14 )   PT: 18.8 sec;   INR: 1.71 ratio         PTT - ( 12 Aug 2017 15:14 )  PTT:39.8 sec    LACTATE:    ABG -     CULTURES:Culture - Urine (08.12.17 @ 09:43)    Specimen Source: .Urine Clean Catch (Midstream)    Culture Results:   No growth    Culture - Blood (08.12.17 @ 01:41)    Specimen Source: .Blood Blood-Peripheral    Culture Results:   No growth to date.    Culture - Blood (08.12.17 @ 01:41)    Specimen Source: .Blood Blood-Peripheral    Culture Results:   No growth to date.           RADIOLOGY:  EXAM:  CT CHEST                            PROCEDURE DATE:  08/12/2017          INTERPRETATION:  CLINICAL INFORMATION: Follow-up spiculated lung nodule.    COMPARISON: CT chest 5/16/2017 and 4/3/2017.    PROCEDURE:   CT of the Chest was performed without intravenous contrast.  Sagittal and coronal reformats were performed.      FINDINGS:    CHEST:     LUNGS AND LARGE AIRWAYS: Patent central airways. Near complete resolution   of the right upper lobe spiculated opacity. Near complete resolution of   the SPECT dilated opacity seen along the right major fissure with   residual opacity measuring 1.3 x 1.1 cm (previously 3.2 x 1.4 cm).   Bilateral lower lobe linear opacities likely atelectasis. There complete   resolution of right middle lobe opacity. Stable scattered calcified and   noncalcified granulomas. Emphysematous changes of the lungs, stable.  PLEURA: No pleural effusion.  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 Stable aneurysm of the descending thoracic aorta   measures 5.2 cm.  HEART: Heart size is normal.Trace pericardial effusion.  MEDIASTINUM AND KARINA: No lymphadenopathy.  CHEST WALL AND LOWER NECK: Within normal limits.  VISUALIZED UPPER ABDOMEN: Within normal limits.  BONES: Degenerative changes of the spine.    IMPRESSION: Near complete resolution of multiple right lung opacities   which likely represented multifocal pneumonia. Residual opacities may   represent scarring, however, one more follow-up CT would be prudent to  ensure complete resolution or at least no interval growth.            EXAM:  CHEST PORTABLE ROUTINE                            PROCEDURE DATE:  08/13/2017          INTERPRETATION:  Clinical information: Rule out fluid overload. Status   post 3.5 L of fluid.    Portable semierect chest x-ray from 1006 hours    COMPARISON: August 11, 2017    FINDINGS: There is mild left lower lobe atelectasis. The lungs are   otherwise clear. There is no evidence of pulmonary vascular congestion.   Left hilar opacity corresponds to the patient's known descending thoracic   aortic aneurysm.    IMPRESSION:    No evidence of pulmonary vascular congestion.                    ROS  [  ] UNABLE TO ELICIT HPI:  82 years old male from home, AAO x 3 but very poor historian, with PMH of Alzheimer's dementia, AAA (stable) , BPH, CKD, COPD, HLD, and Osteoarthritis and PSHx of Bladder Tumor (s/p resection in May 2017) brought in by wife to ED for c/o hypotension, increased fatigue and weakness. Denies any chest pain, sob, palpitations, abdominal pain, nausea, vomiting, headache, fever, recent travel. Denies any changes in BM. Denies any urinary complains including burning, dysuria or frequency. Called wife who provided a detailed history.  Wife took patient to PCP's office and was found to have BP in 80s. So PCP recommended to come to ED for further evaluation. 911 was called and patient was brought to ED.    Labs significant for elevated wbc of 21.5, Lactate of 2.4, UA positive for wbc of 26-50, moderate Leukocyte esterase. 1 dose of Maxipime prior to urine culture being sent.  pt is answering all my questions appropiately, he denies any urinary complaints prior to admission and even now, he denies having a significant cough and has no sob.      PAST MEDICAL & SURGICAL HISTORY:  CAD (coronary artery disease): no obstructive  Lung nodule: right  Primary osteoarthritis of both hips  COPD (chronic obstructive pulmonary disease)  Cigarette nicotine dependence  Cataract: bilateral eyes  CKD (chronic kidney disease), stage 3 (moderate)  UTI (urinary tract infection)  Syncope: and near syncope  Vitamin D deficiency  AAA (abdominal aortic aneurysm)  HLD (hyperlipidemia)  BPH (benign prostatic hyperplasia)  Alzheimer disease  Hypercholesteremia  History of wrist fracture: s/p sx of right- 2006  Benign bladder tumor: s/p TURPT- 2006, July 2016  Abdominal aneurysm: repair- 2003  S/P hip replacement, bilateral: 2004, 2005  Bladder cancer: low grade  S/P wrist surgery  AAA (abdominal aortic aneurysm): treated surgically in 2007      No Known Allergies      Meds:  aspirin  chewable 81 milliGRAM(s) Oral daily  simvastatin 20 milliGRAM(s) Oral at bedtime  cholecalciferol 1000 Unit(s) Oral daily  cefTRIAXone   IVPB   IV Intermittent   enoxaparin Injectable 40 milliGRAM(s) SubCutaneous daily  cefTRIAXone   IVPB 1 Gram(s) IV Intermittent every 24 hours  sodium chloride 0.9%. 1000 milliLiter(s) IV Continuous <Continuous>  donepezil 5 milliGRAM(s) Oral at bedtime  memantine 5 milliGRAM(s) Oral two times a day      SOCIAL HISTORY:  Smoker:  active smoker 2-3 cigarettes per day  ETOH use:  no        FAMILY HISTORY:unknown      VITALS:  Vital Signs Last 24 Hrs  T(C): 37.7 (13 Aug 2017 15:03), Max: 37.7 (13 Aug 2017 15:03)  T(F): 99.9 (13 Aug 2017 15:03), Max: 99.9 (13 Aug 2017 15:03)  HR: 112 (13 Aug 2017 15:03) (57 - 113)  BP: 94/63 (13 Aug 2017 15:03) (85/55 - 94/63)  BP(mean): --  RR: 16 (13 Aug 2017 15:03) (16 - 17)  SpO2: 95% (13 Aug 2017 15:03) (95% - 98%)    LABS/DIAGNOSTIC TESTS:                          13.7   24.9  )-----------( 252      ( 13 Aug 2017 06:23 )             41.9     WBC Count: 24.9 K/uL (08-13 @ 06:23)  WBC Count: 21.3 K/uL (08-12 @ 06:30)  WBC Count: 21.5 K/uL (08-11 @ 13:09)      08-13    138  |  104  |  19<H>  ----------------------------<  109<H>  4.0   |  26  |  1.20    Ca    8.3<L>      13 Aug 2017 06:23  Phos  3.0     08-12  Mg     2.4     08-12          Culture - Blood (08.12.17 @ 01:41)    Specimen Source: .Blood Blood-Peripheral    Culture Results:   No growth to date.          PT/INR - ( 12 Aug 2017 15:14 )   PT: 18.8 sec;   INR: 1.71 ratio         PTT - ( 12 Aug 2017 15:14 )  PTT:39.8 sec    LACTATE:    ABG -     CULTURES:Culture - Urine (08.12.17 @ 09:43)    Specimen Source: .Urine Clean Catch (Midstream)    Culture Results:   No growth    Culture - Blood (08.12.17 @ 01:41)    Specimen Source: .Blood Blood-Peripheral    Culture Results:   No growth to date.    Culture - Blood (08.12.17 @ 01:41)    Specimen Source: .Blood Blood-Peripheral    Culture Results:   No growth to date.           RADIOLOGY:  EXAM:  CT CHEST                            PROCEDURE DATE:  08/12/2017          INTERPRETATION:  CLINICAL INFORMATION: Follow-up spiculated lung nodule.    COMPARISON: CT chest 5/16/2017 and 4/3/2017.    PROCEDURE:   CT of the Chest was performed without intravenous contrast.  Sagittal and coronal reformats were performed.      FINDINGS:    CHEST:     LUNGS AND LARGE AIRWAYS: Patent central airways. Near complete resolution   of the right upper lobe spiculated opacity. Near complete resolution of   the SPECT dilated opacity seen along the right major fissure with   residual opacity measuring 1.3 x 1.1 cm (previously 3.2 x 1.4 cm).   Bilateral lower lobe linear opacities likely atelectasis. There complete   resolution of right middle lobe opacity. Stable scattered calcified and   noncalcified granulomas. Emphysematous changes of the lungs, stable.  PLEURA: No pleural effusion.  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 Stable aneurysm of the descending thoracic aorta   measures 5.2 cm.  HEART: Heart size is normal.Trace pericardial effusion.  MEDIASTINUM AND KARINA: No lymphadenopathy.  CHEST WALL AND LOWER NECK: Within normal limits.  VISUALIZED UPPER ABDOMEN: Within normal limits.  BONES: Degenerative changes of the spine.    IMPRESSION: Near complete resolution of multiple right lung opacities   which likely represented multifocal pneumonia. Residual opacities may   represent scarring, however, one more follow-up CT would be prudent to  ensure complete resolution or at least no interval growth.            EXAM:  CHEST PORTABLE ROUTINE                            PROCEDURE DATE:  08/13/2017          INTERPRETATION:  Clinical information: Rule out fluid overload. Status   post 3.5 L of fluid.    Portable semierect chest x-ray from 1006 hours    COMPARISON: August 11, 2017    FINDINGS: There is mild left lower lobe atelectasis. The lungs are   otherwise clear. There is no evidence of pulmonary vascular congestion.   Left hilar opacity corresponds to the patient's known descending thoracic   aortic aneurysm.    IMPRESSION:    No evidence of pulmonary vascular congestion.                    ROS  [  ] UNABLE TO ELICIT

## 2017-08-13 NOTE — PROGRESS NOTE ADULT - PROBLEM SELECTOR PLAN 5
CT chest form May 2017 showed: Slight interval decrease in size of right middle lobe spiculated nodular opacity, now measuring 2.7 x 1.4 cm (previously 3.0 x 1.6 cm).   Patient denies any cough, sputum production or hemoptysis.   Repeat CT chest: Near complete resolution of multiple right lung opacities   which likely represented multifocal pneumonia.

## 2017-08-13 NOTE — CHART NOTE - NSCHARTNOTEFT_GEN_A_CORE
Patient had a BP of 85/ 55 mm hg. Bp was repeated manually and was still the same . Patient had no Fever , no tachypnea . Patient has a history of hypotension and as per primary team , bolus to be given as needed. 500 cc normal saline bolus was given . Patient had a BP of 85/ 55 mm hg. Bp was repeated manually and 94/60 . Patient had no Fever , no tachypnea . No bolus given . Asked nurse to repeat BP in an hour .

## 2017-08-14 LAB
ANION GAP SERPL CALC-SCNC: 13 MMOL/L — SIGNIFICANT CHANGE UP (ref 5–17)
BASOPHILS # BLD AUTO: 0.1 K/UL — SIGNIFICANT CHANGE UP (ref 0–0.2)
BASOPHILS NFR BLD AUTO: 0.7 % — SIGNIFICANT CHANGE UP (ref 0–2)
BUN SERPL-MCNC: 18 MG/DL — SIGNIFICANT CHANGE UP (ref 7–18)
CALCIUM SERPL-MCNC: 8.3 MG/DL — LOW (ref 8.4–10.5)
CHLORIDE SERPL-SCNC: 108 MMOL/L — SIGNIFICANT CHANGE UP (ref 96–108)
CO2 SERPL-SCNC: 22 MMOL/L — SIGNIFICANT CHANGE UP (ref 22–31)
CORTIS AM PEAK SERPL-MCNC: 23.9 UG/DL — HIGH (ref 6–18.4)
CREAT SERPL-MCNC: 1.04 MG/DL — SIGNIFICANT CHANGE UP (ref 0.5–1.3)
EOSINOPHIL # BLD AUTO: 0 K/UL — SIGNIFICANT CHANGE UP (ref 0–0.5)
EOSINOPHIL NFR BLD AUTO: 0.1 % — SIGNIFICANT CHANGE UP (ref 0–6)
GLUCOSE SERPL-MCNC: 94 MG/DL — SIGNIFICANT CHANGE UP (ref 70–99)
HCT VFR BLD CALC: 44.4 % — SIGNIFICANT CHANGE UP (ref 39–50)
HGB BLD-MCNC: 14.4 G/DL — SIGNIFICANT CHANGE UP (ref 13–17)
LYMPHOCYTES # BLD AUTO: 1.2 K/UL — SIGNIFICANT CHANGE UP (ref 1–3.3)
LYMPHOCYTES # BLD AUTO: 5.6 % — LOW (ref 13–44)
MAGNESIUM SERPL-MCNC: 2.4 MG/DL — SIGNIFICANT CHANGE UP (ref 1.6–2.6)
MCHC RBC-ENTMCNC: 31.4 PG — SIGNIFICANT CHANGE UP (ref 27–34)
MCHC RBC-ENTMCNC: 32.5 GM/DL — SIGNIFICANT CHANGE UP (ref 32–36)
MCV RBC AUTO: 96.9 FL — SIGNIFICANT CHANGE UP (ref 80–100)
MONOCYTES # BLD AUTO: 1.1 K/UL — HIGH (ref 0–0.9)
MONOCYTES NFR BLD AUTO: 5.1 % — SIGNIFICANT CHANGE UP (ref 2–14)
NEUTROPHILS # BLD AUTO: 19 K/UL — HIGH (ref 1.8–7.4)
NEUTROPHILS NFR BLD AUTO: 88.4 % — HIGH (ref 43–77)
PHOSPHATE SERPL-MCNC: 2.8 MG/DL — SIGNIFICANT CHANGE UP (ref 2.5–4.5)
PLATELET # BLD AUTO: 280 K/UL — SIGNIFICANT CHANGE UP (ref 150–400)
POTASSIUM SERPL-MCNC: 4.1 MMOL/L — SIGNIFICANT CHANGE UP (ref 3.5–5.3)
POTASSIUM SERPL-SCNC: 4.1 MMOL/L — SIGNIFICANT CHANGE UP (ref 3.5–5.3)
RBC # BLD: 4.59 M/UL — SIGNIFICANT CHANGE UP (ref 4.2–5.8)
RBC # FLD: 13.1 % — SIGNIFICANT CHANGE UP (ref 10.3–14.5)
SODIUM SERPL-SCNC: 143 MMOL/L — SIGNIFICANT CHANGE UP (ref 135–145)
WBC # BLD: 21.4 K/UL — HIGH (ref 3.8–10.5)
WBC # FLD AUTO: 21.4 K/UL — HIGH (ref 3.8–10.5)

## 2017-08-14 RX ADMIN — SIMVASTATIN 20 MILLIGRAM(S): 20 TABLET, FILM COATED ORAL at 22:38

## 2017-08-14 RX ADMIN — DONEPEZIL HYDROCHLORIDE 5 MILLIGRAM(S): 10 TABLET, FILM COATED ORAL at 22:38

## 2017-08-14 RX ADMIN — CEFTRIAXONE 100 GRAM(S): 500 INJECTION, POWDER, FOR SOLUTION INTRAMUSCULAR; INTRAVENOUS at 22:38

## 2017-08-14 RX ADMIN — Medication 81 MILLIGRAM(S): at 12:12

## 2017-08-14 RX ADMIN — ENOXAPARIN SODIUM 40 MILLIGRAM(S): 100 INJECTION SUBCUTANEOUS at 12:12

## 2017-08-14 RX ADMIN — MEMANTINE HYDROCHLORIDE 5 MILLIGRAM(S): 10 TABLET ORAL at 17:28

## 2017-08-14 RX ADMIN — Medication 1000 UNIT(S): at 12:12

## 2017-08-14 RX ADMIN — SODIUM CHLORIDE 75 MILLILITER(S): 9 INJECTION INTRAMUSCULAR; INTRAVENOUS; SUBCUTANEOUS at 05:29

## 2017-08-14 RX ADMIN — MEMANTINE HYDROCHLORIDE 5 MILLIGRAM(S): 10 TABLET ORAL at 05:29

## 2017-08-14 NOTE — PROGRESS NOTE ADULT - SUBJECTIVE AND OBJECTIVE BOX
PGY 1 Note discussed with supervising resident and primary attending    Patient is a 82y old  Male who presents with a chief complaint of hypotension, unsteady gait, increased fatigue. (11 Aug 2017 15:46)      INTERVAL HPI/OVERNIGHT EVENTS: Patient was seen and examined at bed side. Patient was doing much better today. Patient was answering questions and was cooperative during examination.     MEDICATIONS  (STANDING):  aspirin  chewable 81 milliGRAM(s) Oral daily  simvastatin 20 milliGRAM(s) Oral at bedtime  cholecalciferol 1000 Unit(s) Oral daily  cefTRIAXone   IVPB   IV Intermittent   enoxaparin Injectable 40 milliGRAM(s) SubCutaneous daily  cefTRIAXone   IVPB 1 Gram(s) IV Intermittent every 24 hours  sodium chloride 0.9%. 1000 milliLiter(s) (75 mL/Hr) IV Continuous <Continuous>  donepezil 5 milliGRAM(s) Oral at bedtime  memantine 5 milliGRAM(s) Oral two times a day    MEDICATIONS  (PRN):      __________________________________________________  REVIEW OF SYSTEMS:    CONSTITUTIONAL: No fever,   RESPIRATORY: No cough; No shortness of breath  CARDIOVASCULAR: No chest pain, no palpitations  GASTROINTESTINAL: No pain. No nausea or vomiting; No diarrhea       Vital Signs Last 24 Hrs  T(C): 36.5 (14 Aug 2017 08:18), Max: 37.7 (13 Aug 2017 15:03)  T(F): 97.7 (14 Aug 2017 08:18), Max: 99.9 (13 Aug 2017 15:03)  HR: 97 (14 Aug 2017 08:18) (81 - 112)  BP: 122/93 (14 Aug 2017 08:18) (94/63 - 122/93)  BP(mean): --  RR: 16 (14 Aug 2017 08:18) (16 - 16)  SpO2: 97% (14 Aug 2017 08:18) (95% - 97%)    ________________________________________________  PHYSICAL EXAM:  GENERAL: NAD  HEENT: Normocephalic;  conjunctivae and sclerae clear; moist mucous membranes;   CHEST/LUNG: Dec breath sounds on Left Lung fields, Audible rhonchi on Right Lower lung field.   HEART: S1 S2  regular; no murmurs,  ABDOMEN: Soft, Nontender, Nondistended; Bowel sounds present  EXTREMITIES: no cyanosis; no edema; no calf tenderness  SKIN: warm and dry; no rash  NERVOUS SYSTEM:  Awake and alert.     _________________________________________________  LABS:                        14.4   21.4  )-----------( 280      ( 14 Aug 2017 07:10 )             44.4     08-14    143  |  108  |  18  ----------------------------<  94  4.1   |  22  |  1.04    Ca    8.3<L>      14 Aug 2017 07:10  Phos  2.8     08-14  Mg     2.4     08-14      PT/INR - ( 12 Aug 2017 15:14 )   PT: 18.8 sec;   INR: 1.71 ratio         PTT - ( 12 Aug 2017 15:14 )  PTT:39.8 sec        Care Discussed with Consultants :     Plan of care was discussed with patient and /or primary care giver; all questions and concerns were addressed and care was aligned with patient's wishes. PGY 1 Note discussed with supervising resident and primary attending    Patient is a 82y old  Male who presents with a chief complaint of hypotension, unsteady gait, increased fatigue. (11 Aug 2017 15:46)      INTERVAL HPI/OVERNIGHT EVENTS: Patient was seen and examined at bed side. Patient was doing much better today. Patient was answering questions and was cooperative during examination.     MEDICATIONS  (STANDING):  aspirin  chewable 81 milliGRAM(s) Oral daily  simvastatin 20 milliGRAM(s) Oral at bedtime  cholecalciferol 1000 Unit(s) Oral daily  cefTRIAXone   IVPB   IV Intermittent   enoxaparin Injectable 40 milliGRAM(s) SubCutaneous daily  cefTRIAXone   IVPB 1 Gram(s) IV Intermittent every 24 hours  sodium chloride 0.9%. 1000 milliLiter(s) (75 mL/Hr) IV Continuous <Continuous>  donepezil 5 milliGRAM(s) Oral at bedtime  memantine 5 milliGRAM(s) Oral two times a day    __________________________________________________  REVIEW OF SYSTEMS:    CONSTITUTIONAL: No fever,   RESPIRATORY: No cough; No shortness of breath  CARDIOVASCULAR: No chest pain, no palpitations  GASTROINTESTINAL: No pain. No nausea or vomiting; No diarrhea   Extr: No swelling  Neuro: Alert, awake      Vital Signs Last 24 Hrs  T(C): 36.5 (14 Aug 2017 08:18), Max: 37.7 (13 Aug 2017 15:03)  T(F): 97.7 (14 Aug 2017 08:18), Max: 99.9 (13 Aug 2017 15:03)  HR: 97 (14 Aug 2017 08:18) (81 - 112)  BP: 122/93 (14 Aug 2017 08:18) (94/63 - 122/93)  RR: 16 (14 Aug 2017 08:18) (16 - 16)  SpO2: 97% (14 Aug 2017 08:18) (95% - 97%)    ________________________________________________  PHYSICAL EXAM:  GENERAL: NAD  HEENT: Normocephalic;  conjunctivae and sclerae clear; moist mucous membranes;   CHEST/LUNG: Dec breath sounds on Left Lung fields, Audible rhonchi on Right Lower lung field.   HEART: S1 S2  regular; no murmurs,  ABDOMEN: Soft, Nontender, Nondistended; Bowel sounds present  EXTREMITIES: no cyanosis; no edema; no calf tenderness  SKIN: warm and dry; no rash  NERVOUS SYSTEM:  Awake and alert. Forgetful    _________________________________________________  LABS:                        14.4   21.4  )-----------( 280      ( 14 Aug 2017 07:10 )             44.4     08-14    143  |  108  |  18  ----------------------------<  94  4.1   |  22  |  1.04    Ca    8.3<L>      14 Aug 2017 07:10  Phos  2.8     08-14  Mg     2.4     08-14      PT/INR - ( 12 Aug 2017 15:14 )   PT: 18.8 sec;   INR: 1.71 ratio         PTT - ( 12 Aug 2017 15:14 )  PTT:39.8 sec        Care Discussed with Consultants :     Plan of care was discussed with patient and /or primary care giver; all questions and concerns were addressed and care was aligned with patient's wishes.

## 2017-08-14 NOTE — PROGRESS NOTE ADULT - ATTENDING COMMENTS
Patient seen and examined earlier today; Doing much better clinically; alert and awake; poor oral intake persist; BP much better controlled after reducing dosage of Aricept and namenda.     Vitals: reviewed; Stable    P/E:  As above    Labs: Reviewed; stable; WBC elevated improving    D/D:  Hypotension due to possibly underlying infection plus Drug induced  UTI likely  dementia    Plan:  As above  Continue IV Rocephin; ID eval appreciated  Will continue low dose Aricept and Namenda  No need for Midodrine  No evidence of Adrenal Insufficiency; Cortisol AM/PM okay  PT evaluation Patient seen and examined earlier today; Doing much better clinically; alert and awake; poor oral intake persist; BP much better controlled after reducing dosage of Aricept and namenda.     Vitals: reviewed; Stable    P/E:  As above    Labs: Reviewed; stable; WBC elevated improving    D/D:  Hypotension due to possibly underlying infection plus Drug induced  UTI likely  dementia    Plan:  As above  Continue IV Rocephin; ID eval appreciated  Will continue low dose Aricept and Namenda  No need for Midodrine  No evidence of Adrenal Insufficiency; Cortisol AM/PM okay  PT follow up as patient is improving clinically; initial eval suggested STR

## 2017-08-14 NOTE — PROGRESS NOTE ADULT - PROBLEM SELECTOR PLAN 4
Likely due to dehydration 2/2 UTI or could be deconditions from advancing age.   c/w IVF  f/u Vitamin b12, folate and Vitamin D which is WNL  Fall precautions.

## 2017-08-14 NOTE — PROGRESS NOTE ADULT - PROBLEM SELECTOR PLAN 6
Stable; Discussed with wife, Patient has been on Aricept and Namenda for about 5 yrs; Given persistent low BP, will cut back Aricept and Namenda ;  Discussed with Son at bedside and wife over the phone; Agreed.  -Patient improved after cut down on medication dose.   -will continue to monitor.

## 2017-08-14 NOTE — PROGRESS NOTE ADULT - PROBLEM SELECTOR PLAN 1
c/w Rocephin 1g daily;  -UCx, BCx negative up to date  Leucocytosis trending up concerning for Resistant organism;   Uirne Cx collected after a dose of Cefepime; Discussed with ID dr. Gong for switching antibiotics. As per Dr Gong source of infection not pertinent. will continue with Rocephin for now.  -follow wbc trend. 21.4 K today.

## 2017-08-14 NOTE — PROGRESS NOTE ADULT - PROBLEM SELECTOR PLAN 2
Aricept and Namenda dose adjusted and lowered to 5mg once and 5mg BID respectively. B.P improved to 122/93. Patient very much improved. Likely due to poly pharmacy.  BP in 90s-100s Yesterday, Monitored BP closely,  Patient given bolus yesterday;    Continue maintenance IVF.

## 2017-08-14 NOTE — PROGRESS NOTE ADULT - PROBLEM SELECTOR PLAN 8
Spoke with Son at bedside and wife over the phone reviewed Patient prior expressed wishes. She has Living will and confirms Patient is DNR status. DNR Status

## 2017-08-15 ENCOUNTER — TRANSCRIPTION ENCOUNTER (OUTPATIENT)
Age: 82
End: 2017-08-15

## 2017-08-15 DIAGNOSIS — D72.829 ELEVATED WHITE BLOOD CELL COUNT, UNSPECIFIED: ICD-10-CM

## 2017-08-15 LAB
ALBUMIN SERPL ELPH-MCNC: 2.1 G/DL — LOW (ref 3.5–5)
ALP SERPL-CCNC: 169 U/L — HIGH (ref 40–120)
ALT FLD-CCNC: 137 U/L DA — HIGH (ref 10–60)
ANION GAP SERPL CALC-SCNC: 11 MMOL/L — SIGNIFICANT CHANGE UP (ref 5–17)
AST SERPL-CCNC: 189 U/L — HIGH (ref 10–40)
BILIRUB DIRECT SERPL-MCNC: 0.4 MG/DL — HIGH (ref 0–0.2)
BILIRUB INDIRECT FLD-MCNC: 0.9 MG/DL — SIGNIFICANT CHANGE UP (ref 0.2–1)
BILIRUB SERPL-MCNC: 1.3 MG/DL — HIGH (ref 0.2–1.2)
CHLORIDE SERPL-SCNC: 109 MMOL/L — HIGH (ref 96–108)
CO2 SERPL-SCNC: 24 MMOL/L — SIGNIFICANT CHANGE UP (ref 22–31)
ERYTHROCYTE [SEDIMENTATION RATE] IN BLOOD: 65 MM/HR — HIGH (ref 0–20)
HCT VFR BLD CALC: 42.6 % — SIGNIFICANT CHANGE UP (ref 39–50)
HGB BLD-MCNC: 14.1 G/DL — SIGNIFICANT CHANGE UP (ref 13–17)
INR BLD: 1.43 RATIO — HIGH (ref 0.88–1.16)
MCHC RBC-ENTMCNC: 31.2 PG — SIGNIFICANT CHANGE UP (ref 27–34)
MCHC RBC-ENTMCNC: 33 GM/DL — SIGNIFICANT CHANGE UP (ref 32–36)
MCV RBC AUTO: 94.5 FL — SIGNIFICANT CHANGE UP (ref 80–100)
PLATELET # BLD AUTO: 320 K/UL — SIGNIFICANT CHANGE UP (ref 150–400)
POTASSIUM SERPL-MCNC: 4.5 MMOL/L — SIGNIFICANT CHANGE UP (ref 3.5–5.3)
POTASSIUM SERPL-SCNC: 4.5 MMOL/L — SIGNIFICANT CHANGE UP (ref 3.5–5.3)
PROT SERPL-MCNC: 6.6 G/DL — SIGNIFICANT CHANGE UP (ref 6–8.3)
PROTHROM AB SERPL-ACNC: 15.7 SEC — HIGH (ref 9.8–12.7)
RBC # BLD: 4.51 M/UL — SIGNIFICANT CHANGE UP (ref 4.2–5.8)
RBC # FLD: 13 % — SIGNIFICANT CHANGE UP (ref 10.3–14.5)
SODIUM SERPL-SCNC: 144 MMOL/L — SIGNIFICANT CHANGE UP (ref 135–145)
WBC # BLD: 23.6 K/UL — HIGH (ref 3.8–10.5)
WBC # FLD AUTO: 23.6 K/UL — HIGH (ref 3.8–10.5)

## 2017-08-15 PROCEDURE — 74177 CT ABD & PELVIS W/CONTRAST: CPT | Mod: 26

## 2017-08-15 PROCEDURE — 71260 CT THORAX DX C+: CPT | Mod: 26

## 2017-08-15 RX ADMIN — DONEPEZIL HYDROCHLORIDE 5 MILLIGRAM(S): 10 TABLET, FILM COATED ORAL at 22:00

## 2017-08-15 RX ADMIN — Medication 1000 UNIT(S): at 13:06

## 2017-08-15 RX ADMIN — MEMANTINE HYDROCHLORIDE 5 MILLIGRAM(S): 10 TABLET ORAL at 19:43

## 2017-08-15 RX ADMIN — Medication 81 MILLIGRAM(S): at 13:05

## 2017-08-15 RX ADMIN — ENOXAPARIN SODIUM 40 MILLIGRAM(S): 100 INJECTION SUBCUTANEOUS at 13:05

## 2017-08-15 RX ADMIN — SODIUM CHLORIDE 75 MILLILITER(S): 9 INJECTION INTRAMUSCULAR; INTRAVENOUS; SUBCUTANEOUS at 22:01

## 2017-08-15 RX ADMIN — MEMANTINE HYDROCHLORIDE 5 MILLIGRAM(S): 10 TABLET ORAL at 05:24

## 2017-08-15 NOTE — CONSULT NOTE ADULT - SUBJECTIVE AND OBJECTIVE BOX
82 year old male with dementia, AAA, CKD, bladder tumor s/p resection, was found to be hypotensive and fatigue.  UA showed infection. no fever, chill, sob, abd pain, diarrhea.  he has leukocytosis despite antibiotics.  awake. no palpable nodes at neck. chest is clear. abd is soft and nontender. no edema at legs.                        14.1   23.6  )-----------( 320      ( 15 Aug 2017 06:10 )             42.6   08-15    144  |  109<H>  |  x   ----------------------------<  x   4.5   |  24  |  x     Ca    8.3<L>      14 Aug 2017 07:10  Phos  2.8     08-14  Mg     2.4     08-14    TPro  6.6  /  Alb  2.1<L>  /  TBili  1.3<H>  /  DBili  0.4<H>  /  AST  189<H>  /  ALT  137<H>  /  AlkPhos  169<H>  08-15  < from: CT Chest w/ IV Cont (08.15.17 @ 12:46) >  TECHNIQUE: CT scan of the chest, abdomen and pelvis was performed with   IV, without oral contrast. Multiplanar reformations were made.    FINDINGS:  No enlarged axillary, mediastinal or hilar adenopathy.    Heart size is normal in size. Coronary artery calcifications. Small   pericardial effusion. 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 The left vertebral   artery arises directly off the aortic arch, an anatomic variant.    Interval development of small bilateral pleural effusions with adjacent   subsegmental atelectasis of both lower lobes, right middle lobe and   lingula. Mild underlying emphysematous changes in the lungs again noted.   No pneumothorax. Trachea and central airways are patent.    The liver, gallbladder, pancreas, spleen and adrenal glands are   unremarkable.    Kidneys enhance symmetrically. No hydroureteronephrosis.    Evaluation of the GI tract is limited without oral contrast. Colonic   diverticulosis without evidence of diverticulitis. GI tract is   unobstructed. Appendix is not visualized. No free air or ascites.    No bulky intra-abdominal or pelvic adenopathy. Normal caliber abdominal   aorta. Surgical staples are noted along the anterior aspect of the   abdominal aortic wall as well as soft tissue thickening. Moderate   atherosclerosis.    Evaluation of the pelvis is limited due to streak artifact from the   patient's bilateralhip arthroplasties.    Moderate amount of air is seen in the urinary bladder. No free pelvic   fluid.    Degenerative osseous changes are present.    IMPRESSION:  No CT evidence for suspicious pulmonary nodule, or intra-abdominal or   pelvic mass.    Small bilateral pleural effusions with adjacent subsegmental atelectasis.      < end of copied text >  < from: CT Chest w/ IV Cont (08.15.17 @ 12:46) >  Colonic diverticulosis without CT evidence of diverticulitis.    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        < end of copied text >

## 2017-08-15 NOTE — DISCHARGE NOTE ADULT - PLAN OF CARE
Prevent reoccurence and treat infection You were diagnosed with UTI and was treated with antibiotics rocephin. You symptoms resolved and your condition improved. You completed course of antibiotics during your stay. You were treated for hypotension and encephalopathy probably due to underlying infection and advance dementia. You were treated with fluids and your dementia medication dose was decreased which improved your blood pressure which was likely low due to poly pharmacy. Please continue medication on prescribed dose and follow up with your primary care doctor for further changes in medication if required in future. Right on stable on current dose. You were diagnosed with unspecified leukocytosis. we evaluated you with blood culture and urine culture which are negative up to date. CT chest and abdomen were unremarkable. We called oncology consult for you which suggested?? You have history of alzeihmer;s dementia and was treated with your home medication aricept and nemanda. we decreased dose of these medications and your condition improved remarkably. please continue with current dose. Follow up with pcp in 4 weeks. right now stable on current dose. You have history of hyperlipidemia and was treated with simvastatin for that. You were diagnosed with UTI and was treated with antibiotics rocephin. You symptoms resolved and your condition improved. You completed course of antibiotics during your stay. No more antibiotics needed for you. You were treated for hypotension and encephalopathy probably due to underlying infection and advance dementia. You were treated with fluids and your dementia medication dose was decreased which improved your blood pressure which was likely low due to poly pharmacy. Please continue medication on prescribed dose and follow up with your primary care doctor for further changes in medication if required in future. Right now stable on current dose. You were diagnosed with unspecified leukocytosis. we evaluated you with blood culture and urine culture which are negative up to date. CT chest and abdomen were unremarkable. We called oncology consult for you which suggested no mature leukocytes in your blood. leukocytosis resolving. You have history of hyperlipidemia and was treated with simvastatin for that.Please continue taking simvastatin as prescribed.

## 2017-08-15 NOTE — PROGRESS NOTE ADULT - PROBLEM SELECTOR PLAN 2
Aricept and Namenda dose adjusted and lowered to 5mg once and 5mg BID respectively. B.P improved to 122/93. Patient very much improved. Likely due to poly pharmacy.  BP in 120s Yesterday, stable today   Monitored BP closely,   Continue maintenance IVF.  Adrenal insufficiency ruled out by am/pm cortisol level.

## 2017-08-15 NOTE — PROGRESS NOTE ADULT - SUBJECTIVE AND OBJECTIVE BOX
82y Male    Meds:  cefTRIAXone   IVPB   IV Intermittent   cefTRIAXone   IVPB 1 Gram(s) IV Intermittent every 24 hours    Allergies    No Known Allergies    Intolerances        VITALS:  Vital Signs Last 24 Hrs  T(C): 36.5 (15 Aug 2017 15:33), Max: 37.2 (15 Aug 2017 07:48)  T(F): 97.7 (15 Aug 2017 15:33), Max: 98.9 (15 Aug 2017 07:48)  HR: 110 (15 Aug 2017 15:33) (95 - 110)  BP: 111/83 (15 Aug 2017 15:33) (101/76 - 111/83)  BP(mean): --  RR: 18 (15 Aug 2017 15:33) (16 - 18)  SpO2: 100% (15 Aug 2017 15:33) (95% - 100%)    LABS/DIAGNOSTIC TESTS:                          14.1   23.6  )-----------( 320      ( 15 Aug 2017 06:10 )             42.6         08-15    144  |  109<H>  |  x   ----------------------------<  x   4.5   |  24  |  x     Ca    8.3<L>      14 Aug 2017 07:10  Phos  2.8     08-14  Mg     2.4     08-14    TPro  6.6  /  Alb  2.1<L>  /  TBili  1.3<H>  /  DBili  0.4<H>  /  AST  189<H>  /  ALT  137<H>  /  AlkPhos  169<H>  08-15      LIVER FUNCTIONS - ( 15 Aug 2017 11:31 )  Alb: 2.1 g/dL / Pro: 6.6 g/dL / ALK PHOS: 169 U/L / ALT: 137 U/L DA / AST: 189 U/L / GGT: x             CULTURES:       RADIOLOGY:< from: CT Abdomen and Pelvis w/ IV Cont (08.15.17 @ 12:40) >  EXAM:  CT ABDOMEN AND PELVIS IC                          EXAM:  CT CHEST IC                            PROCEDURE DATE:  08/15/2017          INTERPRETATION:  CT CHEST, ABDOMEN AND PELVIS WITH IV CONTRAST    HISTORY: Weakness. Concern for malignancy.    COMPARISON: CT chest 8/12/2017. CT abdomen pelvis 4/3/2017.    TECHNIQUE: CT scan of the chest, abdomen and pelvis was performed with   IV, without oral contrast. Multiplanar reformations were made.    FINDINGS:  No enlarged axillary, mediastinal or hilar adenopathy.    Heart size is normal in size. Coronary artery calcifications. Small   pericardial effusion. 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 The left vertebral   artery arises directly off the aortic arch, an anatomic variant.    Interval development of small bilateral pleural effusions with adjacent   subsegmental atelectasis of both lower lobes, right middle lobe and   lingula. Mild underlying emphysematous changes in the lungs again noted.   No pneumothorax. Trachea and central airways are patent.    The liver, gallbladder, pancreas, spleen and adrenal glands are   unremarkable.    Kidneys enhance symmetrically. No hydroureteronephrosis.    Evaluation of the GI tract is limited without oral contrast. Colonic   diverticulosis without evidence of diverticulitis. GI tract is   unobstructed. Appendix is not visualized. No free air or ascites.    No bulky intra-abdominal or pelvic adenopathy. Normal caliber abdominal   aorta. Surgical staples are noted along the anterior aspect of the   abdominal aortic wall as well as soft tissue thickening. Moderate   atherosclerosis.  < from: CT Abdomen and Pelvis w/ IV Cont (08.15.17 @ 12:40) >  Evaluation of the pelvis is limited due to streak artifact from the   patient's bilateralhip arthroplasties.    Moderate amount of air is seen in the urinary bladder. No free pelvic   fluid.    Degenerative osseous changes are present.    IMPRESSION:  No CT evidence for suspicious pulmonary nodule, or intra-abdominal or   pelvic mass.    Small bilateral pleural effusions with adjacent subsegmental atelectasis.    Colonic diverticulosis without CT evidence of diverticulitis.    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            ROS:  [  ] UNABLE TO ELICIT 82y Male who is looking great after some of his dementia meds were decreased, he is totally asymptomatic, he has no fevers, no diarrhea or other symptoms. He had a ct scan of abdomen which did not show any significant pathology though his LFT increased today. His WBC count remains elevated though.    Meds:  cefTRIAXone   IVPB   IV Intermittent   cefTRIAXone   IVPB 1 Gram(s) IV Intermittent every 24 hours    Allergies    No Known Allergies    Intolerances        VITALS:  Vital Signs Last 24 Hrs  T(C): 36.5 (15 Aug 2017 15:33), Max: 37.2 (15 Aug 2017 07:48)  T(F): 97.7 (15 Aug 2017 15:33), Max: 98.9 (15 Aug 2017 07:48)  HR: 110 (15 Aug 2017 15:33) (95 - 110)  BP: 111/83 (15 Aug 2017 15:33) (101/76 - 111/83)  BP(mean): --  RR: 18 (15 Aug 2017 15:33) (16 - 18)  SpO2: 100% (15 Aug 2017 15:33) (95% - 100%)    LABS/DIAGNOSTIC TESTS:                          14.1   23.6  )-----------( 320      ( 15 Aug 2017 06:10 )             42.6         08-15    144  |  109<H>  |  x   ----------------------------<  x   4.5   |  24  |  x     Ca    8.3<L>      14 Aug 2017 07:10  Phos  2.8     08-14  Mg     2.4     08-14    TPro  6.6  /  Alb  2.1<L>  /  TBili  1.3<H>  /  DBili  0.4<H>  /  AST  189<H>  /  ALT  137<H>  /  AlkPhos  169<H>  08-15      LIVER FUNCTIONS - ( 15 Aug 2017 11:31 )  Alb: 2.1 g/dL / Pro: 6.6 g/dL / ALK PHOS: 169 U/L / ALT: 137 U/L DA / AST: 189 U/L / GGT: x             CULTURES:       RADIOLOGY:< from: CT Abdomen and Pelvis w/ IV Cont (08.15.17 @ 12:40) >  EXAM:  CT ABDOMEN AND PELVIS IC                          EXAM:  CT CHEST IC                            PROCEDURE DATE:  08/15/2017          INTERPRETATION:  CT CHEST, ABDOMEN AND PELVIS WITH IV CONTRAST    HISTORY: Weakness. Concern for malignancy.    COMPARISON: CT chest 8/12/2017. CT abdomen pelvis 4/3/2017.    TECHNIQUE: CT scan of the chest, abdomen and pelvis was performed with   IV, without oral contrast. Multiplanar reformations were made.    FINDINGS:  No enlarged axillary, mediastinal or hilar adenopathy.    Heart size is normal in size. Coronary artery calcifications. Small   pericardial effusion. 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 The left vertebral   artery arises directly off the aortic arch, an anatomic variant.    Interval development of small bilateral pleural effusions with adjacent   subsegmental atelectasis of both lower lobes, right middle lobe and   lingula. Mild underlying emphysematous changes in the lungs again noted.   No pneumothorax. Trachea and central airways are patent.    The liver, gallbladder, pancreas, spleen and adrenal glands are   unremarkable.    Kidneys enhance symmetrically. No hydroureteronephrosis.    Evaluation of the GI tract is limited without oral contrast. Colonic   diverticulosis without evidence of diverticulitis. GI tract is   unobstructed. Appendix is not visualized. No free air or ascites.    No bulky intra-abdominal or pelvic adenopathy. Normal caliber abdominal   aorta. Surgical staples are noted along the anterior aspect of the   abdominal aortic wall as well as soft tissue thickening. Moderate   atherosclerosis.  < from: CT Abdomen and Pelvis w/ IV Cont (08.15.17 @ 12:40) >  Evaluation of the pelvis is limited due to streak artifact from the   patient's bilateralhip arthroplasties.    Moderate amount of air is seen in the urinary bladder. No free pelvic   fluid.    Degenerative osseous changes are present.    IMPRESSION:  No CT evidence for suspicious pulmonary nodule, or intra-abdominal or   pelvic mass.    Small bilateral pleural effusions with adjacent subsegmental atelectasis.    Colonic diverticulosis without CT evidence of diverticulitis.    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            ROS:  [  ] UNABLE TO ELICIT

## 2017-08-15 NOTE — DISCHARGE NOTE ADULT - SECONDARY DIAGNOSIS.
Hypotension Leukocytosis, unspecified type HLD (hyperlipidemia) COPD (chronic obstructive pulmonary disease) Alzheimer's dementia

## 2017-08-15 NOTE — PROGRESS NOTE ADULT - ATTENDING COMMENTS
Patient seen and examined earlier today; Doing much better clinically; alert and awake; OOB to chair; BP much better controlled after reducing dosage of Aricept and namenda. Family at bedside wife and sister says this is the best patient had been in 2 yrs    As per wife, Patient had Hx elevated WBC count in the past when he had surgeries (Hip replacement)    Vitals: reviewed; Stable    P/E: As above    Labs: Reviewed; stable; WBC elevated still;   urine Cx negative; Blood Cx negative    CT Abdomen and Pelvis w/ IV Cont (08.15.17 @ 12:40) >    IMPRESSION:  No CT evidence for suspicious pulmonary nodule, or intra-abdominal or pelvic mass.    Small bilateral pleural effusions with adjacent subsegmental atelectasis.    Colonic diverticulosis without CT evidence of diverticulitis.    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    D/D:  Elevated LFT (Transaminitis) and Elevated INR concern for RUQ/ Abdominal pathology for Leucocytosis  Hypotension due to possibly underlying infection plus Drug induced  Leucocytosis ?? CLL   UTI possible  dementia    Plan:  As above  Continue IV Rocephin; ID eval appreciated  Will continue low dose Aricept and Namenda  PT follow up appreciated; Patient clinically improved and no more STR, recommend Home PT  CT Abdomen and Pelvis with no significant pathology; Transaminitis could be Drug induced ?? Ceftriaxone  d/w ID may consider D/C Ceftriaxone in next 24 hrs if okay with ID and repeat LFT's  Hematology evaluation Dr. Payne informed;  DVT ppx

## 2017-08-15 NOTE — PROGRESS NOTE ADULT - PROBLEM SELECTOR PLAN 4
-Likely due to dehydration 2/2 UTI or could be deconditions from advancing age.   -c/w IVF  -patient out of bed in chair   -Vitamin b12, folate and Vitamin D which is WNL  -PT consult to evaluate ambulatory status of patient.  -Fall precautions.

## 2017-08-15 NOTE — DISCHARGE NOTE ADULT - HOSPITAL COURSE
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 Patient reported that he missed 2 steps while coming out of apartment building and fell down on his left side, got up by himself , was complaining to have bruise on left arm. Denies any chest pain, sob, palpitations, abdominal pain, nausea, vomiting, headache, fever, recent travel. Denies any changes in BM. Denies any urinary complains including burning, dysuria or frequency. Called wife who provided a detailed history. Per wife, patient did not had any fall, was having unsteady gait and increased fatigue and has been sleeping more lately. Wife took patient to PCP's office and was found to have BP in 80s. So PCP recommended to come to ED for further evaluation. 911 was called and patient was brought to ED.   In ED, upon admission T of 99.2, HR of 79, /78, RR 16, spO2 98%. EKG showed NSR. CXR : No focal lung consolidation. 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 Labs significant for elevated wbc of 21.5, Lactate of 2.4, UA positive for wbc of 26-50, moderate Leukocyte esterase. 1 dose of Maxipime and 3 L bolus given in ED. Patient admitted to medicine floor for further management.     On floor patient received Rocephin, IV fluids and DVT prophylaxis. CT chest and Abdomen performed and insignificant for any pathology. Blood cultures and urine cultures negative so far. BP persistently remained low so cut down on polypharmacy nemenda and aricept. Blood Pressure improved Dramaticaly. Patient previously lethargic in bed now out of bed in chair. Family and patient satisfied with the progress of patient. Physical therapy initially recommended subacute rehab on reevaluation recommended Home physical therapy. For further assistance please refer to patient charts for further confirmation and information of lab workup and imaging.     Plan of care discussed with patient and family. patient and family understands the plan and agrees to it. Patient is stable and will be discharged to???/ 82 years old male from home, AAO x 3 but very poor historian, lives with wife, walks independently, PMH of Alzheimer's dementia, AAA (stable) , BPH, CKD, COPD, HLD, and Osteoarthritis and PSHx of Bladder Tumor (s/p resection in May 2017) brought in by wife to ED for c/o hypotension, increased fatigue and weakness. Patient reported that he missed 2 steps while coming out of apartment building and fell down on his left side, got up by himself , was complaining to have bruise on left arm. Denies any chest pain, sob, palpitations, abdominal pain, nausea, vomiting, headache, fever, recent travel. Denies any changes in BM. Denies any urinary complains including burning, dysuria or frequency. Called wife who provided a detailed history. Per wife, patient did not had any fall, was having unsteady gait and increased fatigue and has been sleeping more lately. Wife took patient to PCP's office and was found to have BP in 80s. So PCP recommended to come to ED for further evaluation. 911 was called and patient was brought to ED.   In ED, upon admission T of 99.2, HR of 79, /78, RR 16, spO2 98%. EKG showed NSR. CXR : No focal lung consolidation. 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 Labs significant for elevated wbc of 21.5, Lactate of 2.4, UA positive for wbc of 26-50, moderate Leukocyte esterase. 1 dose of Maxipime and 3 L bolus given in ED. Patient admitted to medicine floor for further management.     On floor patient received Rocephin, IV fluids and DVT prophylaxis. CT chest and Abdomen performed and insignificant for any pathology. Blood cultures and urine cultures negative so far. BP persistently remained low so cut down on polypharmacy nemenda and aricept. Blood Pressure improved Dramaticaly. Patient previously lethargic in bed now out of bed in chair. Family and patient satisfied with the progress of patient. Physical therapy initially recommended subacute rehab on reevaluation recommended Home physical therapy. Patient is demented and was hallucinating for a day regarding being in a zoo. Psychiatry evaluated patient and suggested that no change should be done in patient's medication regime. For further assistance please refer to patient charts for further confirmation and information of lab workup and imaging.     Plan of care discussed with patient and family. patient and family understands the plan and agrees to it. Patient is stable and will be discharged to???/

## 2017-08-15 NOTE — PROGRESS NOTE ADULT - PROBLEM SELECTOR PLAN 1
c/w Rocephin 1g daily; (Day 4)  -UCx, BCx negative up to date  Leucocytosis trending up concerning for Resistant organism;   Urine Cx collected after a dose of Cefepime; Discussed with ID dr. Gong for switching antibiotics. As per Dr Gong source of infection not pertinent. will continue with Rocephin for now.  -follow wbc trend. 24.9 K today. 21.3 yesterday.  -patient afebrile  will get Ct abdomen pelvis today w contrast to find out cause of leukocytosis.  - will follow up Ct scan results. c/w Rocephin 1g daily; (Day 4)  -UCx, BCx negative up to date  Leucocytosis trending up concerning for Resistant organism;   Urine Cx collected after a dose of Cefepime;As per Dr Gong source of infection not pertinent. will continue with Rocephin for now.  -follow wbc trend. 24.9 K today. 21.3 yesterday.  -patient afebrile  will get Ct abdomen pelvis today w contrast to find out cause of leukocytosis.  - will follow up Ct scan results.

## 2017-08-15 NOTE — CONSULT NOTE ADULT - ASSESSMENT
leukocytosis
leukocytosis - increasing, no obvious source of infection, ? acute bronchitis  ?UTI  plan - cont rocephin 1gm iv qd for now  monitor wbc count for now.

## 2017-08-15 NOTE — DISCHARGE NOTE ADULT - CARE PLAN
Principal Discharge DX:	UTI (urinary tract infection)  Goal:	Prevent reoccurence and treat infection  Instructions for follow-up, activity and diet:	You were diagnosed with UTI and was treated with antibiotics rocephin. You symptoms resolved and your condition improved. You completed course of antibiotics during your stay.  Secondary Diagnosis:	Hypotension  Instructions for follow-up, activity and diet:	You were treated for hypotension and encephalopathy probably due to underlying infection and advance dementia. You were treated with fluids and your dementia medication dose was decreased which improved your blood pressure which was likely low due to poly pharmacy. Please continue medication on prescribed dose and follow up with your primary care doctor for further changes in medication if required in future. Right on stable on current dose.  Secondary Diagnosis:	Leukocytosis, unspecified type  Instructions for follow-up, activity and diet:	You were diagnosed with unspecified leukocytosis. we evaluated you with blood culture and urine culture which are negative up to date. CT chest and abdomen were unremarkable. We called oncology consult for you which suggested??  Secondary Diagnosis:	HLD (hyperlipidemia)  Instructions for follow-up, activity and diet:	You have history of hyperlipidemia and was treated with simvastatin for that.  Secondary Diagnosis:	COPD (chronic obstructive pulmonary disease)  Secondary Diagnosis:	Alzheimer's dementia  Instructions for follow-up, activity and diet:	You have history of alzeihmer;s dementia and was treated with your home medication aricept and nemanda. we decreased dose of these medications and your condition improved remarkably. please continue with current dose. Follow up with pcp in 4 weeks. right now stable on current dose. Principal Discharge DX:	UTI (urinary tract infection)  Goal:	Prevent reoccurence and treat infection  Instructions for follow-up, activity and diet:	You were diagnosed with UTI and was treated with antibiotics rocephin. You symptoms resolved and your condition improved. You completed course of antibiotics during your stay. No more antibiotics needed for you.  Secondary Diagnosis:	Hypotension  Instructions for follow-up, activity and diet:	You were treated for hypotension and encephalopathy probably due to underlying infection and advance dementia. You were treated with fluids and your dementia medication dose was decreased which improved your blood pressure which was likely low due to poly pharmacy. Please continue medication on prescribed dose and follow up with your primary care doctor for further changes in medication if required in future. Right now stable on current dose.  Secondary Diagnosis:	Leukocytosis, unspecified type  Instructions for follow-up, activity and diet:	You were diagnosed with unspecified leukocytosis. we evaluated you with blood culture and urine culture which are negative up to date. CT chest and abdomen were unremarkable. We called oncology consult for you which suggested no mature leukocytes in your blood. leukocytosis resolving.  Secondary Diagnosis:	HLD (hyperlipidemia)  Instructions for follow-up, activity and diet:	You have history of hyperlipidemia and was treated with simvastatin for that.Please continue taking simvastatin as prescribed.  Secondary Diagnosis:	Alzheimer's dementia  Instructions for follow-up, activity and diet:	You have history of alzeihmer;s dementia and was treated with your home medication aricept and nemanda. we decreased dose of these medications and your condition improved remarkably. please continue with current dose. Follow up with pcp in 4 weeks. right now stable on current dose.

## 2017-08-15 NOTE — PROGRESS NOTE ADULT - SUBJECTIVE AND OBJECTIVE BOX
PGY 1 Note discussed with supervising resident and primary attending    Patient is a 82y old  Male who presents with a chief complaint of hypotension, unsteady gait, increased fatigue. (11 Aug 2017 15:46)      INTERVAL HPI/OVERNIGHT EVENTS: Patient was seen and examined at bed side. patient was doing much better today. out of bed in chair. was able to name his wife and childern. was able to recall his home address. Patient was explained the reason his wbc count not trending down. Discussed with patient about getting a CT abdomen to rule out the cause of high wbc. Called wife twice to ask about Ct scan consent. Nobody answered the call. Cannot leave a message. Resident Signed the consent. Patient started on ensure. Patient encouraged to increase per oral intake.     MEDICATIONS  (STANDING):  aspirin  chewable 81 milliGRAM(s) Oral daily  simvastatin 20 milliGRAM(s) Oral at bedtime  cholecalciferol 1000 Unit(s) Oral daily  cefTRIAXone   IVPB   IV Intermittent   enoxaparin Injectable 40 milliGRAM(s) SubCutaneous daily  cefTRIAXone   IVPB 1 Gram(s) IV Intermittent every 24 hours  sodium chloride 0.9%. 1000 milliLiter(s) (75 mL/Hr) IV Continuous <Continuous>  donepezil 5 milliGRAM(s) Oral at bedtime  memantine 5 milliGRAM(s) Oral two times a day    MEDICATIONS  (PRN):      __________________________________________________  REVIEW OF SYSTEMS:    CONSTITUTIONAL: No fever,   EYES: no acute visual disturbances  NECK: No pain or stiffness  RESPIRATORY: No cough; No shortness of breath  CARDIOVASCULAR: No chest pain, no palpitations  GASTROINTESTINAL: No pain. No nausea or vomiting; No diarrhea   NEUROLOGICAL: No headache or numbness, no tremors  MUSCULOSKELETAL: No joint pain, no muscle pain  GENITOURINARY: no dysuria, no frequency, no hesitancy  PSYCHIATRY: no depression , no anxiety  ALL OTHER  ROS negative        Vital Signs Last 24 Hrs  T(C): 37.2 (15 Aug 2017 07:48), Max: 37.2 (15 Aug 2017 07:48)  T(F): 98.9 (15 Aug 2017 07:48), Max: 98.9 (15 Aug 2017 07:48)  HR: 95 (15 Aug 2017 07:48) (79 - 102)  BP: 101/76 (15 Aug 2017 07:48) (97/73 - 103/74)  BP(mean): --  RR: 16 (15 Aug 2017 07:48) (16 - 16)  SpO2: 95% (15 Aug 2017 07:48) (95% - 98%)    ________________________________________________  PHYSICAL EXAM:  GENERAL: NAD  HEENT: Normocephalic;  conjunctivae and sclerae clear; moist mucous membranes;   NECK : supple  CHEST/LUNG: Clear to auscultation bilaterally with good air entry   HEART: S1 S2  regular; no murmurs, gallops or rubs  ABDOMEN: Soft, Nontender, Nondistended; Bowel sounds present  EXTREMITIES: no cyanosis; no edema; no calf tenderness  SKIN: warm and dry; no rash  NERVOUS SYSTEM:  Awake and alert; Oriented  to place, person and time ; no new deficits    _________________________________________________  LABS:                        14.1   23.6  )-----------( 320      ( 15 Aug 2017 06:10 )             42.6     08-15    144  |  109<H>  |  x   ----------------------------<  x   4.5   |  24  |  x     Ca    8.3<L>      14 Aug 2017 07:10  Phos  2.8     08-14  Mg     2.4     08-14    TPro  6.6  /  Alb  2.1<L>  /  TBili  1.3<H>  /  DBili  0.4<H>  /  AST  189<H>  /  ALT  137<H>  /  AlkPhos  169<H>  08-15    PT/INR - ( 15 Aug 2017 06:10 )   PT: 15.7 sec;   INR: 1.43 ratio             CAPILLARY BLOOD GLUCOSE            RADIOLOGY & ADDITIONAL TESTS:    Imaging Personally Reviewed:  YES/NO    Consultant(s) Notes Reviewed:   YES/ No    Care Discussed with Consultants :     Plan of care was discussed with patient and /or primary care giver; all questions and concerns were addressed and care was aligned with patient's wishes. PGY 1 Note discussed with supervising resident and primary attending    Patient is a 82y old  Male who presents with a chief complaint of hypotension, unsteady gait, increased fatigue. (11 Aug 2017 15:46)      INTERVAL HPI/OVERNIGHT EVENTS: Patient was seen and examined at bed side. patient was doing much better today. out of bed in chair. was able to name his wife and childern. was able to recall his home address. Patient was explained the reason his wbc count not trending down. Discussed with patient about getting a CT abdomen to rule out the cause of high wbc. Called wife twice to ask about Ct scan consent. Nobody answered the call. Cannot leave a message. Resident Signed the consent. Patient started on ensure. Patient encouraged to increase per oral intake.     MEDICATIONS  (STANDING):  aspirin  chewable 81 milliGRAM(s) Oral daily  simvastatin 20 milliGRAM(s) Oral at bedtime  cholecalciferol 1000 Unit(s) Oral daily  cefTRIAXone   IVPB   IV Intermittent   enoxaparin Injectable 40 milliGRAM(s) SubCutaneous daily  cefTRIAXone   IVPB 1 Gram(s) IV Intermittent every 24 hours  sodium chloride 0.9%. 1000 milliLiter(s) (75 mL/Hr) IV Continuous <Continuous>  donepezil 5 milliGRAM(s) Oral at bedtime  memantine 5 milliGRAM(s) Oral two times a day    MEDICATIONS  (PRN):      __________________________________________________  REVIEW OF SYSTEMS:    CONSTITUTIONAL: No fever,   EYES: no acute visual disturbances  NECK: No pain or stiffness  RESPIRATORY: No cough; No shortness of breath  CARDIOVASCULAR: No chest pain, no palpitations  GASTROINTESTINAL: No pain. No nausea or vomiting; No diarrhea   NEUROLOGICAL: No headache or numbness, no tremors  MUSCULOSKELETAL: No joint pain, no muscle pain  GENITOURINARY: no dysuria, no frequency, no hesitancy  PSYCHIATRY: no depression , no anxiety  ALL OTHER  ROS negative        Vital Signs Last 24 Hrs  T(C): 37.2 (15 Aug 2017 07:48), Max: 37.2 (15 Aug 2017 07:48)  T(F): 98.9 (15 Aug 2017 07:48), Max: 98.9 (15 Aug 2017 07:48)  HR: 95 (15 Aug 2017 07:48) (79 - 102)  BP: 101/76 (15 Aug 2017 07:48) (97/73 - 103/74)  BP(mean): --  RR: 16 (15 Aug 2017 07:48) (16 - 16)  SpO2: 95% (15 Aug 2017 07:48) (95% - 98%)    ________________________________________________  PHYSICAL EXAM:  GENERAL: NAD  HEENT: Normocephalic;  conjunctivae and sclerae clear; moist mucous membranes;   NECK : supple  CHEST/LUNG: Clear to auscultation bilaterally with good air entry   HEART: S1 S2  regular; no murmurs, gallops or rubs  ABDOMEN: Soft, Nontender, Nondistended; Bowel sounds present  EXTREMITIES: no cyanosis; no edema; no calf tenderness  SKIN: warm and dry; no rash  NERVOUS SYSTEM:  Awake and alert; Oriented  to place, person and time ; no new deficits    _________________________________________________  LABS:                        14.1   23.6  )-----------( 320      ( 15 Aug 2017 06:10 )             42.6     08-15    144  |  109<H>  |  x   ----------------------------<  x   4.5   |  24  |  x     Ca    8.3<L>      14 Aug 2017 07:10  Phos  2.8     08-14  Mg     2.4     08-14    TPro  6.6  /  Alb  2.1<L>  /  TBili  1.3<H>  /  DBili  0.4<H>  /  AST  189<H>  /  ALT  137<H>  /  AlkPhos  169<H>  08-15    PT/INR - ( 15 Aug 2017 06:10 )   PT: 15.7 sec;   INR: 1.43 ratio           RADIOLOGY & ADDITIONAL TESTS:    Consultant(s) Notes Reviewed:   YES    Care Discussed with Consultants : YES    Plan of care was discussed with patient and /or primary care giver; all questions and concerns were addressed and care was aligned with patient's wishes.

## 2017-08-15 NOTE — PROGRESS NOTE ADULT - ASSESSMENT
leukocytosis - persistent, probably a reactive increase given he is asymptomatic and looks so good.  plan - will dc all abxs and monitor wbc and LFT

## 2017-08-15 NOTE — DISCHARGE NOTE ADULT - MEDICATION SUMMARY - MEDICATIONS TO TAKE
I will START or STAY ON the medications listed below when I get home from the hospital:    Aspirin Enteric Coated 81 mg oral delayed release tablet  -- 1 tab(s) by mouth once a day  -- Indication: For Cardioprotective    Zocor 20 mg oral tablet  -- 1 tab(s) by mouth once a day (at bedtime)  -- Indication: For Hyperlipidemia    donepezil 5 mg oral tablet  -- 1 tab(s) by mouth once a day (at bedtime)  -- Indication: For Dementia    memantine 5 mg oral tablet  -- 1 tab(s) by mouth 2 times a day  -- Indication: For Dementia    Daily Multiple Vitamins oral tablet  -- 1 tab(s) by mouth once a day  -- Indication: For Supplements    Vitamin D3 1000 intl units oral capsule  -- 1 cap(s) by mouth once a day  -- Indication: For Supplements I will START or STAY ON the medications listed below when I get home from the hospital:    Aspirin Enteric Coated 81 mg oral delayed release tablet  -- 1 tab(s) by mouth once a day  -- Indication: For Cardioprotective    Zocor 20 mg oral tablet  -- 1 tab(s) by mouth once a day (at bedtime)  -- Indication: For Hyperlipidemia    donepezil 5 mg oral tablet  -- 1 tab(s) by mouth once a day (at bedtime)  -- Indication: For Alzheimer's dementia    memantine 5 mg oral tablet  -- 1 tab(s) by mouth 2 times a day  -- Indication: For Alzheimer's dementia    Daily Multiple Vitamins oral tablet  -- 1 tab(s) by mouth once a day  -- Indication: For Supplements    Vitamin D3 1000 intl units oral capsule  -- 1 cap(s) by mouth once a day  -- Indication: For Supplements

## 2017-08-15 NOTE — DISCHARGE NOTE ADULT - PATIENT PORTAL LINK FT
“You can access the Follow[employer] Patient Portal, offered by [facility], by registering with the following website: [email or web address]”

## 2017-08-15 NOTE — PROGRESS NOTE ADULT - NSHPATTENDINGPLANDISCUSS_GEN_ALL_CORE
PGY1/2; RN
PGY1/2; RN; Patient, Wife and Sister
Patient family at bedside; Wife over phone; RN; PGY1 on call

## 2017-08-16 DIAGNOSIS — F29 UNSPECIFIED PSYCHOSIS NOT DUE TO A SUBSTANCE OR KNOWN PHYSIOLOGICAL CONDITION: ICD-10-CM

## 2017-08-16 DIAGNOSIS — R74.0 NONSPECIFIC ELEVATION OF LEVELS OF TRANSAMINASE AND LACTIC ACID DEHYDROGENASE [LDH]: ICD-10-CM

## 2017-08-16 LAB
ALBUMIN SERPL ELPH-MCNC: 1.9 G/DL — LOW (ref 3.5–5)
ALP SERPL-CCNC: 169 U/L — HIGH (ref 40–120)
ALT FLD-CCNC: 132 U/L DA — HIGH (ref 10–60)
AMMONIA BLD-MCNC: 31 UMOL/L — SIGNIFICANT CHANGE UP (ref 11–32)
ANION GAP SERPL CALC-SCNC: 13 MMOL/L — SIGNIFICANT CHANGE UP (ref 5–17)
AST SERPL-CCNC: 152 U/L — HIGH (ref 10–40)
BASOPHILS # BLD AUTO: 0.1 K/UL — SIGNIFICANT CHANGE UP (ref 0–0.2)
BASOPHILS NFR BLD AUTO: 0.6 % — SIGNIFICANT CHANGE UP (ref 0–2)
BILIRUB SERPL-MCNC: 0.9 MG/DL — SIGNIFICANT CHANGE UP (ref 0.2–1.2)
BUN SERPL-MCNC: 17 MG/DL — SIGNIFICANT CHANGE UP (ref 7–18)
CALCIUM SERPL-MCNC: 8 MG/DL — LOW (ref 8.4–10.5)
CHLORIDE SERPL-SCNC: 109 MMOL/L — HIGH (ref 96–108)
CO2 SERPL-SCNC: 21 MMOL/L — LOW (ref 22–31)
CREAT SERPL-MCNC: 0.9 MG/DL — SIGNIFICANT CHANGE UP (ref 0.5–1.3)
CRP SERPL-MCNC: 22.3 MG/DL — HIGH (ref 0–0.4)
EOSINOPHIL # BLD AUTO: 0.1 K/UL — SIGNIFICANT CHANGE UP (ref 0–0.5)
EOSINOPHIL NFR BLD AUTO: 0.7 % — SIGNIFICANT CHANGE UP (ref 0–6)
GLUCOSE SERPL-MCNC: 64 MG/DL — LOW (ref 70–99)
HAV IGM SER-ACNC: SIGNIFICANT CHANGE UP
HBV CORE IGM SER-ACNC: SIGNIFICANT CHANGE UP
HBV SURFACE AG SER-ACNC: SIGNIFICANT CHANGE UP
HCT VFR BLD CALC: 37.2 % — LOW (ref 39–50)
HCV AB S/CO SERPL IA: 0.11 S/CO — SIGNIFICANT CHANGE UP
HCV AB SERPL-IMP: SIGNIFICANT CHANGE UP
HGB BLD-MCNC: 12.3 G/DL — LOW (ref 13–17)
LYMPHOCYTES # BLD AUTO: 1.5 K/UL — SIGNIFICANT CHANGE UP (ref 1–3.3)
LYMPHOCYTES # BLD AUTO: 7.5 % — LOW (ref 13–44)
MCHC RBC-ENTMCNC: 31 PG — SIGNIFICANT CHANGE UP (ref 27–34)
MCHC RBC-ENTMCNC: 33 GM/DL — SIGNIFICANT CHANGE UP (ref 32–36)
MCV RBC AUTO: 93.9 FL — SIGNIFICANT CHANGE UP (ref 80–100)
MONOCYTES # BLD AUTO: 0.9 K/UL — SIGNIFICANT CHANGE UP (ref 0–0.9)
MONOCYTES NFR BLD AUTO: 4.3 % — SIGNIFICANT CHANGE UP (ref 2–14)
NEUTROPHILS # BLD AUTO: 17.5 K/UL — HIGH (ref 1.8–7.4)
NEUTROPHILS NFR BLD AUTO: 87 % — HIGH (ref 43–77)
PLATELET # BLD AUTO: 321 K/UL — SIGNIFICANT CHANGE UP (ref 150–400)
POTASSIUM SERPL-MCNC: 3.9 MMOL/L — SIGNIFICANT CHANGE UP (ref 3.5–5.3)
POTASSIUM SERPL-SCNC: 3.9 MMOL/L — SIGNIFICANT CHANGE UP (ref 3.5–5.3)
PROCALCITONIN SERPL-MCNC: 0.32 NG/ML — HIGH (ref 0–0.04)
PROT SERPL-MCNC: 5.9 G/DL — LOW (ref 6–8.3)
RBC # BLD: 3.96 M/UL — LOW (ref 4.2–5.8)
RBC # FLD: 13.3 % — SIGNIFICANT CHANGE UP (ref 10.3–14.5)
SODIUM SERPL-SCNC: 143 MMOL/L — SIGNIFICANT CHANGE UP (ref 135–145)
WBC # BLD: 20.2 K/UL — HIGH (ref 3.8–10.5)
WBC # FLD AUTO: 20.2 K/UL — HIGH (ref 3.8–10.5)

## 2017-08-16 RX ORDER — HALOPERIDOL DECANOATE 100 MG/ML
0.5 INJECTION INTRAMUSCULAR ONCE
Qty: 0 | Refills: 0 | Status: COMPLETED | OUTPATIENT
Start: 2017-08-16 | End: 2017-08-16

## 2017-08-16 RX ORDER — ALBUMIN HUMAN 25 %
50 VIAL (ML) INTRAVENOUS EVERY 8 HOURS
Qty: 0 | Refills: 0 | Status: DISCONTINUED | OUTPATIENT
Start: 2017-08-16 | End: 2017-08-17

## 2017-08-16 RX ORDER — ALBUMIN HUMAN 25 %
250 VIAL (ML) INTRAVENOUS EVERY 12 HOURS
Qty: 0 | Refills: 0 | Status: DISCONTINUED | OUTPATIENT
Start: 2017-08-16 | End: 2017-08-16

## 2017-08-16 RX ORDER — ALBUMIN HUMAN 25 %
50 VIAL (ML) INTRAVENOUS EVERY 8 HOURS
Qty: 0 | Refills: 0 | Status: DISCONTINUED | OUTPATIENT
Start: 2017-08-16 | End: 2017-08-16

## 2017-08-16 RX ADMIN — Medication 81 MILLIGRAM(S): at 11:21

## 2017-08-16 RX ADMIN — MEMANTINE HYDROCHLORIDE 5 MILLIGRAM(S): 10 TABLET ORAL at 06:27

## 2017-08-16 RX ADMIN — MEMANTINE HYDROCHLORIDE 5 MILLIGRAM(S): 10 TABLET ORAL at 17:19

## 2017-08-16 RX ADMIN — SODIUM CHLORIDE 75 MILLILITER(S): 9 INJECTION INTRAMUSCULAR; INTRAVENOUS; SUBCUTANEOUS at 06:27

## 2017-08-16 RX ADMIN — ENOXAPARIN SODIUM 40 MILLIGRAM(S): 100 INJECTION SUBCUTANEOUS at 11:21

## 2017-08-16 RX ADMIN — Medication 1000 UNIT(S): at 11:21

## 2017-08-16 RX ADMIN — Medication 50 MILLILITER(S): at 22:28

## 2017-08-16 RX ADMIN — SODIUM CHLORIDE 75 MILLILITER(S): 9 INJECTION INTRAMUSCULAR; INTRAVENOUS; SUBCUTANEOUS at 11:34

## 2017-08-16 RX ADMIN — Medication 50 MILLILITER(S): at 13:20

## 2017-08-16 RX ADMIN — DONEPEZIL HYDROCHLORIDE 5 MILLIGRAM(S): 10 TABLET, FILM COATED ORAL at 22:28

## 2017-08-16 NOTE — PROGRESS NOTE ADULT - SUBJECTIVE AND OBJECTIVE BOX
Patient is a 82y old  Male who presents with a chief complaint of hypotension, unsteady gait, increased fatigue. (15 Aug 2017 19:42)    INTERVAL HPI/OVERNIGHT EVENTS:  Patient seen and examined at bedside, Family: wife and her sister at bedside, saying patient is very talkative today which is not his baseline,   that he is imagining people in the room, talking to them, claiming a Gorilla shaved him today.  When asked patient, he believes [place] is [place] the [place], and keeps changing his language from English to [place]ic.  Family denies any prior liver problems.    Allergies  No Known Allergies  Intolerances    REVIEW OF SYSTEMS:  Unalbe to obtain due to dementia    Medications:  aspirin  chewable 81 milliGRAM(s) Oral daily  cholecalciferol 1000 Unit(s) Oral daily  enoxaparin Injectable 40 milliGRAM(s) SubCutaneous daily  sodium chloride 0.9%. 1000 milliLiter(s) IV Continuous <Continuous>  donepezil 5 milliGRAM(s) Oral at bedtime  memantine 5 milliGRAM(s) Oral two times a day  albumin human 25% IVPB 50 milliLiter(s) IV Intermittent every 8 hours      PHYSICAL EXAM:  Vital Signs Last 24 Hrs  T(C): 36.8 (16 Aug 2017 15:44), Max: 36.8 (16 Aug 2017 15:44)  T(F): 98.2 (16 Aug 2017 15:44), Max: 98.2 (16 Aug 2017 15:44)  HR: 64 (16 Aug 2017 15:44) (51 - 94)  BP: 114/75 (16 Aug 2017 15:44) (87/67 - 114/75)  BP(mean): --  RR: 18 (16 Aug 2017 15:44) (16 - 18)  SpO2: 95% (16 Aug 2017 15:44) (95% - 97%)    GENERAL: NAD  HEAD:  Atraumatic, Normocephalic  EYES: EOMI, PERRLA, conjunctiva and sclera clear  ENT: moist mucous membranes  NECK: Supple, No JVD, Normal thyroid  NERVOUS SYSTEM:  able to move all 4 extremities against gravity.  CHEST/LUNG: No rales, rhonchi, wheezing, or rubs, evidence of Gynecomastia  HEART: Regular rate and rhythm; No murmurs, rubs, or gallops  ABDOMEN: Soft, Nontender, Nondistended; Bowel sounds present  EXTREMITIES:  2+ Peripheral Pulses, No clubbing, cyanosis, or edema  SKIN: No rashes or lesions    LABS:                        12.3   20.2  )-----------( 321      ( 16 Aug 2017 08:25 )             37.2     08-16    143  |  109<H>  |  17  ----------------------------<  64<L>  3.9   |  21<L>  |  0.90    Ca    8.0<L>      16 Aug 2017 08:25    TPro  5.9<L>  /  Alb  1.9<L>  /  TBili  0.9  /  DBili  x   /  AST  152<H>  /  ALT  132<H>  /  AlkPhos  169<H>  08-16    LIVER FUNCTIONS - ( 16 Aug 2017 08:25 )  Alb: 1.9 g/dL / Pro: 5.9 g/dL / ALK PHOS: 169 U/L / ALT: 132 U/L DA / AST: 152 U/L / GGT: x             PT/INR - ( 15 Aug 2017 06:10 )   PT: 15.7 sec;   INR: 1.43 ratio      Culture & Sensitivity:   CAPILLARY BLOOD GLUCOSE  08-15 @ 07:01  -  08-16 @ 07:00  --------------------------------------------------------  IN: 0 mL / OUT: 1 mL / NET: -1 mL        RADIOLOGY & ADDITIONAL TESTS:  Radiology testing independently reviewed:   < from: CT Abdomen and Pelvis w/ IV Cont (08.15.17 @ 12:40) >    IMPRESSION:  No CT evidence for suspicious pulmonary nodule, or intra-abdominal or   pelvic mass.    Small bilateral pleural effusions with adjacent subsegmental atelectasis.    Colonic diverticulosis without CT evidence of diverticulitis.    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    < end of copied text >      Consultant(s) Notes Reviewed:  [ x] YES  [ ] NO    Care Discussed with Consultants/Other Providers [ x] YES  [ ] NO

## 2017-08-16 NOTE — PROGRESS NOTE ADULT - SUBJECTIVE AND OBJECTIVE BOX
PGY 1 Note discussed with supervising resident and primary attending    Patient is a 82y old  Male who presents with a chief complaint of hypotension, unsteady gait, increased fatigue. (15 Aug 2017 19:42)      INTERVAL HPI/OVERNIGHT EVENTS: patient was seen and examined at bed side. patient was acting different today. patient was talking about him being in a zoo. Patient is more talkative.     MEDICATIONS  (STANDING):  aspirin  chewable 81 milliGRAM(s) Oral daily  cholecalciferol 1000 Unit(s) Oral daily  enoxaparin Injectable 40 milliGRAM(s) SubCutaneous daily  sodium chloride 0.9%. 1000 milliLiter(s) (75 mL/Hr) IV Continuous <Continuous>  donepezil 5 milliGRAM(s) Oral at bedtime  memantine 5 milliGRAM(s) Oral two times a day  haloperidol    Injectable 0.5 milliGRAM(s) IntraMuscular once  albumin human 25% IVPB 50 milliLiter(s) IV Intermittent every 8 hours    MEDICATIONS  (PRN):      __________________________________________________  REVIEW OF SYSTEMS:    CONSTITUTIONAL: No fever,   EYES: no acute visual disturbances  RESPIRATORY: No cough; No shortness of breath  CARDIOVASCULAR: No chest pain, no palpitations  GASTROINTESTINAL: No pain. No nausea or vomiting; No diarrhea   NEUROLOGICAL: No headache or numbness, no tremors  MUSCULOSKELETAL: No joint pain, no muscle pain  GENITOURINARY: no dysuria, no frequency, no hesitancy  PSYCHIATRY: dementia. Hallucinating today.   ALL OTHER  ROS negative        Vital Signs Last 24 Hrs  T(C): 36.8 (16 Aug 2017 15:44), Max: 36.8 (16 Aug 2017 15:44)  T(F): 98.2 (16 Aug 2017 15:44), Max: 98.2 (16 Aug 2017 15:44)  HR: 64 (16 Aug 2017 15:44) (51 - 94)  BP: 114/75 (16 Aug 2017 15:44) (87/67 - 114/75)  BP(mean): --  RR: 18 (16 Aug 2017 15:44) (16 - 18)  SpO2: 95% (16 Aug 2017 15:44) (95% - 97%)    ________________________________________________  PHYSICAL EXAM:  GENERAL: NAD  HEENT: Normocephalic;  conjunctivae and sclerae clear; moist mucous membranes;   CHEST/LUNG: Dec breath sounds on Left Lung fields, Audible rhonchi on Right Lower lung field.   HEART: S1 S2  regular; no murmurs,  ABDOMEN: Soft, Nontender, Nondistended; Bowel sounds present  EXTREMITIES: no cyanosis; no edema; no calf tenderness  SKIN: warm and dry; no rash  NERVOUS SYSTEM:  Awake and alert. Forgetful, seeing him self in zoo and said a gorilla did his shave today     _________________________________________________  LABS:                        12.3   20.2  )-----------( 321      ( 16 Aug 2017 08:25 )             37.2     08-16    143  |  109<H>  |  17  ----------------------------<  64<L>  3.9   |  21<L>  |  0.90    Ca    8.0<L>      16 Aug 2017 08:25    TPro  5.9<L>  /  Alb  1.9<L>  /  TBili  0.9  /  DBili  x   /  AST  152<H>  /  ALT  132<H>  /  AlkPhos  169<H>  08-16    PT/INR - ( 15 Aug 2017 06:10 )   PT: 15.7 sec;   INR: 1.43 ratio             CAPILLARY BLOOD GLUCOSE            RADIOLOGY & ADDITIONAL TESTS:    refer to Imaging in PACS    Care Discussed with Consultants :     Plan of care was discussed with patient ,family and covering attending Dr Orellana; all questions and concerns were addressed and care was aligned with patient's wishes.

## 2017-08-16 NOTE — PROGRESS NOTE ADULT - SUBJECTIVE AND OBJECTIVE BOX
82y Male    Meds:    Allergies    No Known Allergies    Intolerances        VITALS:  Vital Signs Last 24 Hrs  T(C): 36.3 (16 Aug 2017 08:32), Max: 36.5 (15 Aug 2017 15:33)  T(F): 97.4 (16 Aug 2017 08:32), Max: 97.7 (15 Aug 2017 15:33)  HR: 94 (16 Aug 2017 08:32) (51 - 110)  BP: 87/67 (16 Aug 2017 08:32) (87/67 - 111/83)  BP(mean): --  RR: 16 (16 Aug 2017 08:32) (16 - 18)  SpO2: 95% (16 Aug 2017 08:32) (95% - 100%)    LABS/DIAGNOSTIC TESTS:                          12.3   20.2  )-----------( 321      ( 16 Aug 2017 08:25 )             37.2         08-16    143  |  109<H>  |  17  ----------------------------<  64<L>  3.9   |  21<L>  |  0.90    Ca    8.0<L>      16 Aug 2017 08:25    TPro  5.9<L>  /  Alb  1.9<L>  /  TBili  0.9  /  DBili  x   /  AST  152<H>  /  ALT  132<H>  /  AlkPhos  169<H>  08-16      LIVER FUNCTIONS - ( 16 Aug 2017 08:25 )  Alb: 1.9 g/dL / Pro: 5.9 g/dL / ALK PHOS: 169 U/L / ALT: 132 U/L DA / AST: 152 U/L / GGT: x             CULTURES:       RADIOLOGY:      ROS:  [  ] UNABLE TO ELICIT 82y Male who looks well and denies any complaints but is much more confused today and even had visual hallucinations today as per his wife and sister. he has had no fevers or diarrhea, his wbs count has decreased a little off abxs. his LFTS are marginally better only.    Meds:    Allergies    No Known Allergies    Intolerances        VITALS:  Vital Signs Last 24 Hrs  T(C): 36.3 (16 Aug 2017 08:32), Max: 36.5 (15 Aug 2017 15:33)  T(F): 97.4 (16 Aug 2017 08:32), Max: 97.7 (15 Aug 2017 15:33)  HR: 94 (16 Aug 2017 08:32) (51 - 110)  BP: 87/67 (16 Aug 2017 08:32) (87/67 - 111/83)  BP(mean): --  RR: 16 (16 Aug 2017 08:32) (16 - 18)  SpO2: 95% (16 Aug 2017 08:32) (95% - 100%)    LABS/DIAGNOSTIC TESTS:                          12.3   20.2  )-----------( 321      ( 16 Aug 2017 08:25 )             37.2         08-16    143  |  109<H>  |  17  ----------------------------<  64<L>  3.9   |  21<L>  |  0.90    Ca    8.0<L>      16 Aug 2017 08:25    TPro  5.9<L>  /  Alb  1.9<L>  /  TBili  0.9  /  DBili  x   /  AST  152<H>  /  ALT  132<H>  /  AlkPhos  169<H>  08-16      LIVER FUNCTIONS - ( 16 Aug 2017 08:25 )  Alb: 1.9 g/dL / Pro: 5.9 g/dL / ALK PHOS: 169 U/L / ALT: 132 U/L DA / AST: 152 U/L / GGT: x             CULTURES:       RADIOLOGY:      ROS:  [  ] UNABLE TO ELICIT

## 2017-08-16 NOTE — PROGRESS NOTE ADULT - PROBLEM SELECTOR PLAN 1
discontinued Antibiotics  -UCx, BCx negative up to date  Leucocytosis trending up concerning for Resistant organism;   Urine Cx collected after a dose of Cefepime;As per Dr Gong source of infection not pertinent.  -follow wbc trend. 20.2. today 24.9 K yesterday.  -patient afebrile  will get Ct abdomen pelvis insignificant for any abdominal pathology.  Dr gomez consulted Hem-onc.   He recommended ESR and CRP.   -No immature cells in blood  -will follow up his recommendation

## 2017-08-16 NOTE — PROGRESS NOTE ADULT - PROBLEM SELECTOR PLAN 2
Cortisol am within normal limit  suspect liver dysfunction as a cause behind the hypotension, especially with low albumin, increase INR and mildly elevated bilirubin.  Mechanism: is decreased oncotic pressure  nutritional supplementation and albumin x 2 days.

## 2017-08-16 NOTE — PROGRESS NOTE ADULT - PROBLEM SELECTOR PLAN 2
Aricept and Namenda dose adjusted and lowered to 5mg once and 5mg BID respectively. B.P improved to 122/93. Patient very much improved. Likely due to poly pharmacy.  BP in 120s Yesterday, fluctuating between 90s and 110s.stable today   Monitored BP closely,   Continue maintenance IVF.  Adrenal insufficiency ruled out by am/pm cortisol level.

## 2017-08-16 NOTE — PROGRESS NOTE ADULT - PROBLEM SELECTOR PLAN 5
Most likely secondary to orthostasis,  would check it at supine and sitting positions.  PT evaluation

## 2017-08-16 NOTE — DIETITIAN INITIAL EVALUATION ADULT. - MD RECOMMEND
continue with Ensure Enlive 1 can TID (1050 Kcal, Protein 60 grams)  with Regular diet, as medically feasible

## 2017-08-16 NOTE — PROGRESS NOTE ADULT - PROBLEM SELECTOR PLAN 4
Improved; Cr 0.90 today, 1.46 on admission (was 0.55 in Jun 2016), likely 2/2 dehydration.  c/w fluids

## 2017-08-16 NOTE — DIETITIAN INITIAL EVALUATION ADULT. - NUTRITION INTERVENTION
Medical Food Supplements/Meals and Snack/Vitamin/Feeding Assistance/Collaboration and Referral of Nutrition Care

## 2017-08-16 NOTE — PROGRESS NOTE ADULT - ASSESSMENT
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 Patient reported that he missed 2 steps while coming out of apartment building and fell down on his left side, got up by himself , was complaining to have bruise on left arm. Denies any chest pain, sob, palpitations, abdominal pain, nausea, vomiting, headache, fever, recent travel. Denies any changes in BM. Denies any urinary complains including burning, dysuria or frequency. Called wife who provided a detailed history. Per wife, patient did not had any fall, was having unsteady gait and increased fatigue and has been sleeping more lately. Wife took patient to PCP's office and was found to have BP in 80s. So PCP recommended to come to ED for further evaluation. 911 was called and patient was brought to ED.   In ED, upon admission T of 99.2, HR of 79, /78, RR 16, spO2 98%. EKG showed NSR. CXR : No focal lung consolidation. 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 Labs significant for elevated wbc of 21.5, Lactate of 2.4, UA positive for wbc of 26-50, moderate Leukocyte esterase. 1 dose of Maxipime and 3 L bolus given in ED. Patient admitted to medicine floor for further management. (11 Aug 2017 15:46)

## 2017-08-16 NOTE — PROGRESS NOTE ADULT - PROBLEM SELECTOR PLAN 6
Stable; Discussed with wife, Patient has been on Aricept and Namenda for about 5 yrs; Given persistent low BP, will cut back Aricept and Namenda ;  Discussed with Son at bedside and wife over the phone; Agreed.  -Patient improved after cut down on medication dose.   -Patient become more talkative today  - was hallucinating that he is [place].  -Psych consulted Dr. Baker appreciate  -will continue to monitor. Stable; Discussed with wife, Patient has been on Aricept and Namenda for about 5 yrs; Given persistent low BP, will cut back Aricept and Namenda ;  Discussed with Son at bedside and wife over the phone; Agreed.  -Patient improved after cut down on medication dose.   -Patient become more talkative today  - was hallucinating that he is in [place].  - Liver enzymes high on blood workup Alp 169,,   - will check Hepatitis , Serum ammonia, Low serum albumin will give Albumin 25% 50ml Q8 x 2 days.   -Psych consulted Dr. Baker appreciate  -will continue to monitor.

## 2017-08-17 DIAGNOSIS — G30.9 ALZHEIMER'S DISEASE, UNSPECIFIED: ICD-10-CM

## 2017-08-17 LAB
ALBUMIN SERPL ELPH-MCNC: 2.4 G/DL — LOW (ref 3.5–5)
ALP SERPL-CCNC: 115 U/L — SIGNIFICANT CHANGE UP (ref 40–120)
ALT FLD-CCNC: 112 U/L DA — HIGH (ref 10–60)
ANION GAP SERPL CALC-SCNC: 9 MMOL/L — SIGNIFICANT CHANGE UP (ref 5–17)
AST SERPL-CCNC: 121 U/L — HIGH (ref 10–40)
BILIRUB DIRECT SERPL-MCNC: 0.2 MG/DL — SIGNIFICANT CHANGE UP (ref 0–0.2)
BILIRUB INDIRECT FLD-MCNC: 0.6 MG/DL — SIGNIFICANT CHANGE UP (ref 0.2–1)
BILIRUB SERPL-MCNC: 0.8 MG/DL — SIGNIFICANT CHANGE UP (ref 0.2–1.2)
BUN SERPL-MCNC: 16 MG/DL — SIGNIFICANT CHANGE UP (ref 7–18)
CALCIUM SERPL-MCNC: 8.1 MG/DL — LOW (ref 8.4–10.5)
CHLORIDE SERPL-SCNC: 111 MMOL/L — HIGH (ref 96–108)
CO2 SERPL-SCNC: 25 MMOL/L — SIGNIFICANT CHANGE UP (ref 22–31)
CREAT SERPL-MCNC: 0.92 MG/DL — SIGNIFICANT CHANGE UP (ref 0.5–1.3)
CULTURE RESULTS: SIGNIFICANT CHANGE UP
CULTURE RESULTS: SIGNIFICANT CHANGE UP
GLUCOSE SERPL-MCNC: 82 MG/DL — SIGNIFICANT CHANGE UP (ref 70–99)
HCT VFR BLD CALC: 33.4 % — LOW (ref 39–50)
HGB BLD-MCNC: 10.8 G/DL — LOW (ref 13–17)
MCHC RBC-ENTMCNC: 30.7 PG — SIGNIFICANT CHANGE UP (ref 27–34)
MCHC RBC-ENTMCNC: 32.5 GM/DL — SIGNIFICANT CHANGE UP (ref 32–36)
MCV RBC AUTO: 94.5 FL — SIGNIFICANT CHANGE UP (ref 80–100)
PLATELET # BLD AUTO: 308 K/UL — SIGNIFICANT CHANGE UP (ref 150–400)
POTASSIUM SERPL-MCNC: 3.4 MMOL/L — LOW (ref 3.5–5.3)
POTASSIUM SERPL-SCNC: 3.4 MMOL/L — LOW (ref 3.5–5.3)
PROT SERPL-MCNC: 5.6 G/DL — LOW (ref 6–8.3)
RBC # BLD: 3.53 M/UL — LOW (ref 4.2–5.8)
RBC # FLD: 13.2 % — SIGNIFICANT CHANGE UP (ref 10.3–14.5)
SODIUM SERPL-SCNC: 145 MMOL/L — SIGNIFICANT CHANGE UP (ref 135–145)
SPECIMEN SOURCE: SIGNIFICANT CHANGE UP
SPECIMEN SOURCE: SIGNIFICANT CHANGE UP
WBC # BLD: 13.9 K/UL — HIGH (ref 3.8–10.5)
WBC # FLD AUTO: 13.9 K/UL — HIGH (ref 3.8–10.5)

## 2017-08-17 RX ORDER — POTASSIUM CHLORIDE 20 MEQ
20 PACKET (EA) ORAL ONCE
Qty: 0 | Refills: 0 | Status: COMPLETED | OUTPATIENT
Start: 2017-08-17 | End: 2017-08-17

## 2017-08-17 RX ADMIN — ENOXAPARIN SODIUM 40 MILLIGRAM(S): 100 INJECTION SUBCUTANEOUS at 12:39

## 2017-08-17 RX ADMIN — SODIUM CHLORIDE 75 MILLILITER(S): 9 INJECTION INTRAMUSCULAR; INTRAVENOUS; SUBCUTANEOUS at 05:43

## 2017-08-17 RX ADMIN — Medication 20 MILLIEQUIVALENT(S): at 17:23

## 2017-08-17 RX ADMIN — Medication 50 MILLILITER(S): at 05:41

## 2017-08-17 RX ADMIN — MEMANTINE HYDROCHLORIDE 5 MILLIGRAM(S): 10 TABLET ORAL at 05:41

## 2017-08-17 RX ADMIN — Medication 1000 UNIT(S): at 12:39

## 2017-08-17 RX ADMIN — MEMANTINE HYDROCHLORIDE 5 MILLIGRAM(S): 10 TABLET ORAL at 17:23

## 2017-08-17 RX ADMIN — DONEPEZIL HYDROCHLORIDE 5 MILLIGRAM(S): 10 TABLET, FILM COATED ORAL at 22:34

## 2017-08-17 RX ADMIN — Medication 81 MILLIGRAM(S): at 12:39

## 2017-08-17 NOTE — PROGRESS NOTE ADULT - PROBLEM SELECTOR PLAN 2
B.P today 111/68  Aricept and Namenda dose adjusted and lowered to 5mg once and 5mg BID respectively. B.P improved .   Patient very much improved. Likely due to poly pharmacy.  BP fluctuating between 90s and 110s.   Monitored BP,  discontinue IVF.  Adrenal insufficiency ruled out by am/pm cortisol level.

## 2017-08-17 NOTE — BEHAVIORAL HEALTH ASSESSMENT NOTE - HPI (INCLUDE ILLNESS QUALITY, SEVERITY, DURATION, TIMING, CONTEXT, MODIFYING FACTORS, ASSOCIATED SIGNS AND SYMPTOMS)
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 pt namenda and aricept were decreased due to hypotension. yesterday pt had episode of psychosis (hallucinations).  Pt reports he is too old. knows his name. knows he is in the hospital. sleeps ok. good appetite. no depression, anxiety, laura. no current psychotic sxs. pt is disinhibited and inappropriate. doesn't know why he is in the hospital. says he lives alone and has no family for a long time. says he has no medical problems and takes no meds. doesn't know where the hospital is located.    as per wife they have been  for 53 years. he lives with wife and daughter. has no past psych h/o. saw a neurologist who started him on namenda and aricept and diagnosed him with dementia. for years wife has been making decisions for him. she says pt is docile. he usually walks alone to the bathroom but lately has unsteady gait. he feeds himself. he sits in a chair and sleeps 90% of the day. last few days he has been not eating, sleeping more, and more unsteady. recently stopped smoking.

## 2017-08-17 NOTE — PROGRESS NOTE ADULT - PROBLEM SELECTOR PROBLEM 6
Alzheimer's dementia
Opacity of lung on imaging study

## 2017-08-17 NOTE — BEHAVIORAL HEALTH ASSESSMENT NOTE - NSBHCHARTREVIEWVS_PSY_A_CORE FT
Vital Signs Last 24 Hrs  T(C): 36.6 (17 Aug 2017 07:31), Max: 36.8 (16 Aug 2017 15:44)  T(F): 97.8 (17 Aug 2017 07:31), Max: 98.2 (16 Aug 2017 15:44)  HR: 64 (17 Aug 2017 07:31) (64 - 110)  BP: 99/59 (17 Aug 2017 07:31) (89/58 - 114/75)  BP(mean): --  RR: 16 (17 Aug 2017 07:31) (16 - 18)  SpO2: 96% (17 Aug 2017 07:31) (95% - 97%)

## 2017-08-17 NOTE — PROGRESS NOTE ADULT - SUBJECTIVE AND OBJECTIVE BOX
82y Male    Meds:    Allergies    No Known Allergies    Intolerances        VITALS:  Vital Signs Last 24 Hrs  T(C): 37.2 (17 Aug 2017 15:34), Max: 37.2 (17 Aug 2017 15:34)  T(F): 99 (17 Aug 2017 15:34), Max: 99 (17 Aug 2017 15:34)  HR: 78 (17 Aug 2017 15:34) (64 - 110)  BP: 111/68 (17 Aug 2017 15:34) (89/58 - 111/68)  BP(mean): --  RR: 16 (17 Aug 2017 15:34) (16 - 18)  SpO2: 97% (17 Aug 2017 15:34) (96% - 97%)    LABS/DIAGNOSTIC TESTS:                          10.8   13.9  )-----------( 308      ( 17 Aug 2017 06:53 )             33.4         08-17    145  |  111<H>  |  16  ----------------------------<  82  3.4<L>   |  25  |  0.92    Ca    8.1<L>      17 Aug 2017 06:53    TPro  5.6<L>  /  Alb  2.4<L>  /  TBili  0.8  /  DBili  0.2  /  AST  121<H>  /  ALT  112<H>  /  AlkPhos  115  08-17      LIVER FUNCTIONS - ( 17 Aug 2017 15:13 )  Alb: 2.4 g/dL / Pro: 5.6 g/dL / ALK PHOS: 115 U/L / ALT: 112 U/L DA / AST: 121 U/L / GGT: x             CULTURES:       RADIOLOGY:      ROS:  [  ] UNABLE TO ELICIT 82y Male who is doing well clinically but is still very confused, he denies any symptoms at all. he has no fevers , his wbc count is decreasing as are his LFTS on no abxs.    Meds:    Allergies    No Known Allergies    Intolerances        VITALS:  Vital Signs Last 24 Hrs  T(C): 37.2 (17 Aug 2017 15:34), Max: 37.2 (17 Aug 2017 15:34)  T(F): 99 (17 Aug 2017 15:34), Max: 99 (17 Aug 2017 15:34)  HR: 78 (17 Aug 2017 15:34) (64 - 110)  BP: 111/68 (17 Aug 2017 15:34) (89/58 - 111/68)  BP(mean): --  RR: 16 (17 Aug 2017 15:34) (16 - 18)  SpO2: 97% (17 Aug 2017 15:34) (96% - 97%)    LABS/DIAGNOSTIC TESTS:                          10.8   13.9  )-----------( 308      ( 17 Aug 2017 06:53 )             33.4         08-17    145  |  111<H>  |  16  ----------------------------<  82  3.4<L>   |  25  |  0.92    Ca    8.1<L>      17 Aug 2017 06:53    TPro  5.6<L>  /  Alb  2.4<L>  /  TBili  0.8  /  DBili  0.2  /  AST  121<H>  /  ALT  112<H>  /  AlkPhos  115  08-17      LIVER FUNCTIONS - ( 17 Aug 2017 15:13 )  Alb: 2.4 g/dL / Pro: 5.6 g/dL / ALK PHOS: 115 U/L / ALT: 112 U/L DA / AST: 121 U/L / GGT: x             CULTURES:       RADIOLOGY:      ROS:  [  ] UNABLE TO ELICIT

## 2017-08-17 NOTE — PROGRESS NOTE ADULT - GASTROINTESTINAL DETAILS
soft/nontender/bowel sounds normal/no distention/no guarding
nontender/soft/bowel sounds normal/no distention/no guarding
bowel sounds normal/nontender/no guarding/soft/no distention

## 2017-08-17 NOTE — BEHAVIORAL HEALTH ASSESSMENT NOTE - SUMMARY
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 pt namenda and aricept were decreased due to hypotension. yesterday pt had episode of psychosis (hallucinations).  pt with cognitive decline and new episode of disinhibition and psychosis that resolved. more oriented today than yesterday.  possible worsening dementia or delirium that is resolving.  would continue current dose of aricept and namenda.  follow up with outpt psych.   .

## 2017-08-17 NOTE — PROGRESS NOTE ADULT - PROBLEM SELECTOR PROBLEM 5
Opacity of lung on imaging study
Unsteady gait

## 2017-08-17 NOTE — PROGRESS NOTE ADULT - PROBLEM SELECTOR PLAN 3
Improved  Cr 1.2 today, 1.46 on admission (was 0.55 in Jun 2016), likely 2/2 dehydration with UTI.   c/w fluids  Monitor BMP daily
Improved; Cr 0.90 today, 1.46 on admission (was 0.55 in Jun 2016), likely 2/2 dehydration with UTI.   c/w fluids  Monitor BMP alternate days.
stable renal function, Likely resolved.   Cr 0.92 today, 1.46 on admission (was 0.55 in Jun 2016), likely 2/2 dehydration with UTI.   Monitor BMP alternate days.
Improved; Cr 1.04 yesterday, 1.46 on admission (was 0.55 in Jun 2016), likely 2/2 dehydration with UTI.   c/w fluids  Monitor BMP alternate days.
Improved; Stable Cr 1.04 today, 1.46 on admission (was 0.55 in Jun 2016), likely 2/2 dehydration with UTI.   c/w fluids  Monitor BMP alternate days.
Improved; Stable Cr 1.2, 1.46 on admission (was 0.55 in Jun 2016), likely 2/2 dehydration with UTI.   c/w fluids  Monitor BMP daily
suspecting impaired liver functions  check hepatitis profile and ammonia level

## 2017-08-17 NOTE — PROGRESS NOTE ADULT - PROVIDER SPECIALTY LIST ADULT
Heme/Onc
Heme/Onc
Hospitalist
Infectious Disease
Internal Medicine

## 2017-08-17 NOTE — PROGRESS NOTE ADULT - ATTENDING COMMENTS
Patient seen and examined; Agree with PGY1 A/P above with editing as needed. d/w PGY1 Dr. Hill    Patient clinically doing better today. Was Delirious and hallucinating and Inappropriate behavior to staff yesterday. Improved and at baseline today. Seen by Dr. Baker, psych; Clinically improved. No intervention.    D/D  Leucocytosis Reactive   Possible UTI resolved  Hypotension possibly due to drugs resolved  dementia waxing and Waning  Severe Malnutrition due to poor oral intake    Plan:  Patient clinically stable; Monitor tonight. If remain stable D/C Home tomorrow afternoon. d/w Wife will  around 1PM  d/w  arranged for Quad cane and Commode at home  d/w RN OOB to chair  Encouraged oral intake; Ensure two to three times daily  DNR status  d/w PGY1/2 plan of care

## 2017-08-17 NOTE — PROGRESS NOTE ADULT - PROBLEM SELECTOR PLAN 4
-PT recommended Home PT for the patient. improved from day of admission  -patient out of bed in chair every day.   -Vitamin b12, folate and Vitamin D which is WNL  -Fall precautions.

## 2017-08-17 NOTE — PROGRESS NOTE ADULT - PROBLEM SELECTOR PROBLEM 4
Unsteady gait
Unsteady gait
SAM (acute kidney injury)
Unsteady gait

## 2017-08-17 NOTE — PROGRESS NOTE ADULT - PROBLEM SELECTOR PLAN 1
discontinued Antibiotics  -UCx, BCx negative up to date  Leucocytosis trending down 13.9 from 20.2 from yesterday.   Urine Cx collected after a dose of Cefepime;  As per Dr Gong source of infection not pertinent.  -patient afebrile  will get Ct abdomen pelvis insignificant for any abdominal pathology.  Dr gomez consulted Hem-onc.   He recommended ESR which is 65 and CRP is 22.30   -No immature cells in blood discontinued Antibiotics  -UCx, BCx negative up to date  Leucocytosis trending down 13.9 from 20.2 from yesterday.   Urine Cx collected after a dose of Cefepime;  As per Dr Gong source of infection not pertinent.  -patient afebrile  Ct abdomen pelvis insignificant for any abdominal pathology.  Dr gomez consulted Hem-onc.   He recommended ESR which is 65 and CRP is 22.30   -No immature cells in blood

## 2017-08-17 NOTE — PROGRESS NOTE ADULT - PROBLEM SELECTOR PROBLEM 8
Goals of care, counseling/discussion
Prophylactic measure
Goals of care, counseling/discussion

## 2017-08-17 NOTE — PROGRESS NOTE ADULT - PROBLEM SELECTOR PROBLEM 7
Prophylactic measure
Prophylactic measure
Alzheimer's dementia
Prophylactic measure

## 2017-08-17 NOTE — BEHAVIORAL HEALTH ASSESSMENT NOTE - NSBHCHARTREVIEWLAB_PSY_A_CORE FT
10.8   13.9  )-----------( 308      ( 17 Aug 2017 06:53 )             33.4     08-17    145  |  111<H>  |  16  ----------------------------<  82  3.4<L>   |  25  |  0.92    Ca    8.1<L>      17 Aug 2017 06:53    TPro  5.9<L>  /  Alb  1.9<L>  /  TBili  0.9  /  DBili  x   /  AST  152<H>  /  ALT  132<H>  /  AlkPhos  169<H>  08-16    LIVER FUNCTIONS - ( 16 Aug 2017 08:25 )  Alb: 1.9 g/dL / Pro: 5.9 g/dL / ALK PHOS: 169 U/L / ALT: 132 U/L DA / AST: 152 U/L / GGT: x           TSH    B12  Vitamin B12, Serum: 1213 pg/mL (08-12-17 @ 10:10)    Folate  Folate, Serum: >20.0 ng/mL (08-12-17 @ 10:10)

## 2017-08-17 NOTE — PROGRESS NOTE ADULT - PROBLEM SELECTOR PROBLEM 1
UTI (urinary tract infection)
UTI (urinary tract infection)
Psychosis, unspecified psychosis type
UTI (urinary tract infection)

## 2017-08-17 NOTE — PROGRESS NOTE ADULT - PROBLEM SELECTOR PLAN 6
Stable; Discussed with wife, Patient has been on Aricept and Namenda for about 5 yrs; Given persistent low BP, will cut back Aricept and Namenda ;  Discussed with Son at bedside and wife over the phone; Agreed.  -Patient improved after cut down on medication dose.   -Patient become more talkative yesterday  - was hallucinating that he is [place].  - Liver enzymes high on blood workup Alp 169,,   - Hepatitis Pannel negative , Serum ammonia 31, Low serum albumin likely due to patient not eating well   -given Albumin 25% 50ml Q8 x 2 days ( day 2 today)   -Monitor Hepatic panel  -dc simvastatin day before yesterday  -Psych consulted Dr. Baker appreciated  will continue on current dose of aricept and namenda as per her recommendation  patient stable for Dc by psychiatry recommendation  -will continue to monitor. Stable; Discussed with wife, Patient has been on Aricept and Namenda for about 5 yrs; Given persistent low BP, will cut back Aricept and Namenda ;  Discussed with Son at bedside and wife over the phone; Agreed.  -Patient initially improved after cut down on medication dose.   but Patient become more talkative yesterday  - was hallucinating that he is in [place].  - Liver enzymes high on blood workup Alp 169,,   - Hepatitis Pannel negative , Serum ammonia 31, Low serum albumin likely due to patient not eating well   -given Albumin 25% 50ml Q8 x 2 days ( day 2 today)   -Monitor Hepatic panel  -dc simvastatin day before yesterday  -Psych consulted Dr. Baker appreciated  will continue on current dose of aricept and namenda as per her recommendation  patient stable for Dc by psychiatry recommendation  -will continue to monitor.

## 2017-08-17 NOTE — PROGRESS NOTE ADULT - PROBLEM SELECTOR PROBLEM 3
SAM (acute kidney injury)
Transaminitis
SAM (acute kidney injury)

## 2017-08-17 NOTE — PROGRESS NOTE ADULT - RS GEN PE MLT RESP DETAILS PC
no rhonchi/no wheezes/clear to auscultation bilaterally/no rales
no wheezes/no rales/no rhonchi/clear to auscultation bilaterally
clear to auscultation bilaterally/no wheezes/no rales/no rhonchi

## 2017-08-17 NOTE — PROGRESS NOTE ADULT - SUBJECTIVE AND OBJECTIVE BOX
PGY 1 Note discussed with supervising resident and primary attending    Patient is a 82y old  Male who presents with a chief complaint of hypotension, unsteady gait, increased fatigue. (15 Aug 2017 19:42)      INTERVAL HPI/OVERNIGHT EVENTS: Patient was seen and examined at bed side. patient was not really hallucinating today like yesterday. Likely waxing and waning symptoms of dementia.   MEDICATIONS  (STANDING):  aspirin  chewable 81 milliGRAM(s) Oral daily  cholecalciferol 1000 Unit(s) Oral daily  enoxaparin Injectable 40 milliGRAM(s) SubCutaneous daily  donepezil 5 milliGRAM(s) Oral at bedtime  memantine 5 milliGRAM(s) Oral two times a day  potassium chloride    Tablet ER 20 milliEquivalent(s) Oral once    MEDICATIONS  (PRN):      __________________________________________________    CONSTITUTIONAL: No fever,   RESPIRATORY: No cough; No shortness of breath  CARDIOVASCULAR: No chest pain, no palpitations  GASTROINTESTINAL: No pain. No nausea or vomiting; No diarrhea   Extr: No swelling  Neuro: Alert, awake    Vital Signs Last 24 Hrs  T(C): 37.2 (17 Aug 2017 15:34), Max: 37.2 (17 Aug 2017 15:34)  T(F): 99 (17 Aug 2017 15:34), Max: 99 (17 Aug 2017 15:34)  HR: 78 (17 Aug 2017 15:34) (64 - 110)  BP: 111/68 (17 Aug 2017 15:34) (89/58 - 111/68)  BP(mean): --  RR: 16 (17 Aug 2017 15:34) (16 - 18)  SpO2: 97% (17 Aug 2017 15:34) (96% - 97%)    ________________________________________________    CHEST/LUNG: Dec breath sounds on Left Lung fields, Audible rhonchi on Right Lower lung field.   HEART: S1 S2  regular; no murmurs,  ABDOMEN: Soft, Nontender, Nondistended; Bowel sounds present  EXTREMITIES: no cyanosis; no edema; no calf tenderness  SKIN: warm and dry; no rash  NERVOUS SYSTEM:  Awake and alert. Forgetful  _________________________________________________  LABS:                        10.8   13.9  )-----------( 308      ( 17 Aug 2017 06:53 )             33.4     08-17    145  |  111<H>  |  16  ----------------------------<  82  3.4<L>   |  25  |  0.92    Ca    8.1<L>      17 Aug 2017 06:53    TPro  x   /  Alb  2.4<L>  /  TBili  x   /  DBili  x   /  AST  x   /  ALT  x   /  AlkPhos  x   08-17                    RADIOLOGY & ADDITIONAL TESTS:    Care Discussed with Consultants :     Plan of care was discussed with patient and /or primary care giver; all questions and concerns were addressed and care was aligned with patient's wishes. PGY 1 Note discussed with supervising resident and primary attending    Patient is a 82y old  Male who presents with a chief complaint of hypotension, unsteady gait, increased fatigue. (15 Aug 2017 19:42)      INTERVAL HPI/OVERNIGHT EVENTS: Patient was seen and examined at bed side. patient was not really hallucinating today like yesterday. Likely waxing and waning symptoms of dementia.   MEDICATIONS  (STANDING):  aspirin  chewable 81 milliGRAM(s) Oral daily  cholecalciferol 1000 Unit(s) Oral daily  enoxaparin Injectable 40 milliGRAM(s) SubCutaneous daily  donepezil 5 milliGRAM(s) Oral at bedtime  memantine 5 milliGRAM(s) Oral two times a day  potassium chloride    Tablet ER 20 milliEquivalent(s) Oral once    __________________________________________________    CONSTITUTIONAL: No fever,   RESPIRATORY: No cough; No shortness of breath  CARDIOVASCULAR: No chest pain, no palpitations  GASTROINTESTINAL: No pain. No nausea or vomiting; No diarrhea   Extr: No swelling  Neuro: Alert, awake    Vital Signs Last 24 Hrs  T(C): 37.2 (17 Aug 2017 15:34), Max: 37.2 (17 Aug 2017 15:34)  T(F): 99 (17 Aug 2017 15:34), Max: 99 (17 Aug 2017 15:34)  HR: 78 (17 Aug 2017 15:34) (64 - 110)  BP: 111/68 (17 Aug 2017 15:34) (89/58 - 111/68)  BP(mean): --  RR: 16 (17 Aug 2017 15:34) (16 - 18)  SpO2: 97% (17 Aug 2017 15:34) (96% - 97%)    ________________________________________________    CHEST/LUNG: Dec breath sounds on Left Lung fields, Audible rhonchi on Right Lower lung field.   HEART: S1 S2  regular; no murmurs,  ABDOMEN: Soft, Nontender, Nondistended; Bowel sounds present  EXTREMITIES: no cyanosis; no edema; no calf tenderness  SKIN: warm and dry; no rash  NERVOUS SYSTEM:  Awake and alert. Forgetful  _________________________________________________  LABS:                        10.8   13.9  )-----------( 308      ( 17 Aug 2017 06:53 )             33.4     08-17    145  |  111<H>  |  16  ----------------------------<  82  3.4<L>   |  25  |  0.92    Ca    8.1<L>      17 Aug 2017 06:53    TPro  x   /  Alb  2.4<L>  /  TBili  x   /  DBili  x   /  AST  x   /  ALT  x   /  AlkPhos  x   08-17                    RADIOLOGY & ADDITIONAL TESTS:    Care Discussed with Consultants :     Plan of care was discussed with patient and /or primary care giver; all questions and concerns were addressed and care was aligned with patient's wishes.

## 2017-08-17 NOTE — BEHAVIORAL HEALTH ASSESSMENT NOTE - DESCRIPTION
Alzheimer's dementia, AAA (stable) , BPH, CKD, COPD, HLD, and Osteoarthritis and PSHx of Bladder Tumor

## 2017-08-17 NOTE — PROGRESS NOTE ADULT - ASSESSMENT
leukocytosis  transaminitis   both of above appear to be reactive  plan -keep off abxs and monitor for now.

## 2017-08-18 VITALS — HEART RATE: 67 BPM | DIASTOLIC BLOOD PRESSURE: 67 MMHG | SYSTOLIC BLOOD PRESSURE: 110 MMHG | OXYGEN SATURATION: 97 %

## 2017-08-18 LAB — HEV AB FLD QL: NEGATIVE — SIGNIFICANT CHANGE UP

## 2017-08-18 PROCEDURE — 71045 X-RAY EXAM CHEST 1 VIEW: CPT

## 2017-08-18 PROCEDURE — 97116 GAIT TRAINING THERAPY: CPT

## 2017-08-18 PROCEDURE — 82607 VITAMIN B-12: CPT

## 2017-08-18 PROCEDURE — 71250 CT THORAX DX C-: CPT

## 2017-08-18 PROCEDURE — 80076 HEPATIC FUNCTION PANEL: CPT

## 2017-08-18 PROCEDURE — 74177 CT ABD & PELVIS W/CONTRAST: CPT

## 2017-08-18 PROCEDURE — 80074 ACUTE HEPATITIS PANEL: CPT

## 2017-08-18 PROCEDURE — P9047: CPT

## 2017-08-18 PROCEDURE — 82533 TOTAL CORTISOL: CPT

## 2017-08-18 PROCEDURE — 81001 URINALYSIS AUTO W/SCOPE: CPT

## 2017-08-18 PROCEDURE — 80048 BASIC METABOLIC PNL TOTAL CA: CPT

## 2017-08-18 PROCEDURE — 85652 RBC SED RATE AUTOMATED: CPT

## 2017-08-18 PROCEDURE — 85027 COMPLETE CBC AUTOMATED: CPT

## 2017-08-18 PROCEDURE — 85730 THROMBOPLASTIN TIME PARTIAL: CPT

## 2017-08-18 PROCEDURE — 86790 VIRUS ANTIBODY NOS: CPT

## 2017-08-18 PROCEDURE — 99285 EMERGENCY DEPT VISIT HI MDM: CPT | Mod: 25

## 2017-08-18 PROCEDURE — 80061 LIPID PANEL: CPT

## 2017-08-18 PROCEDURE — 36415 COLL VENOUS BLD VENIPUNCTURE: CPT

## 2017-08-18 PROCEDURE — 85610 PROTHROMBIN TIME: CPT

## 2017-08-18 PROCEDURE — 87040 BLOOD CULTURE FOR BACTERIA: CPT

## 2017-08-18 PROCEDURE — 82306 VITAMIN D 25 HYDROXY: CPT

## 2017-08-18 PROCEDURE — 84100 ASSAY OF PHOSPHORUS: CPT

## 2017-08-18 PROCEDURE — 87086 URINE CULTURE/COLONY COUNT: CPT

## 2017-08-18 PROCEDURE — 96374 THER/PROPH/DIAG INJ IV PUSH: CPT | Mod: 59

## 2017-08-18 PROCEDURE — 97162 PT EVAL MOD COMPLEX 30 MIN: CPT

## 2017-08-18 PROCEDURE — 71046 X-RAY EXAM CHEST 2 VIEWS: CPT

## 2017-08-18 PROCEDURE — 82140 ASSAY OF AMMONIA: CPT

## 2017-08-18 PROCEDURE — 84145 PROCALCITONIN (PCT): CPT

## 2017-08-18 PROCEDURE — 86140 C-REACTIVE PROTEIN: CPT

## 2017-08-18 PROCEDURE — 82746 ASSAY OF FOLIC ACID SERUM: CPT

## 2017-08-18 PROCEDURE — 83036 HEMOGLOBIN GLYCOSYLATED A1C: CPT

## 2017-08-18 PROCEDURE — 84443 ASSAY THYROID STIM HORMONE: CPT

## 2017-08-18 PROCEDURE — 80051 ELECTROLYTE PANEL: CPT

## 2017-08-18 PROCEDURE — 83735 ASSAY OF MAGNESIUM: CPT

## 2017-08-18 PROCEDURE — 83605 ASSAY OF LACTIC ACID: CPT

## 2017-08-18 PROCEDURE — 97530 THERAPEUTIC ACTIVITIES: CPT

## 2017-08-18 PROCEDURE — 71260 CT THORAX DX C+: CPT

## 2017-08-18 PROCEDURE — 80053 COMPREHEN METABOLIC PANEL: CPT

## 2017-08-18 RX ORDER — DONEPEZIL HYDROCHLORIDE 10 MG/1
1 TABLET, FILM COATED ORAL
Qty: 0 | Refills: 0 | COMMUNITY
Start: 2017-08-18

## 2017-08-18 RX ORDER — MEMANTINE HYDROCHLORIDE 10 MG/1
1 TABLET ORAL
Qty: 60 | Refills: 0 | OUTPATIENT
Start: 2017-08-18 | End: 2017-09-17

## 2017-08-18 RX ORDER — MEMANTINE HYDROCHLORIDE 10 MG/1
1 TABLET ORAL
Qty: 0 | Refills: 0 | COMMUNITY
Start: 2017-08-18

## 2017-08-18 RX ORDER — DONEPEZIL HYDROCHLORIDE 10 MG/1
1 TABLET, FILM COATED ORAL
Qty: 30 | Refills: 0 | OUTPATIENT
Start: 2017-08-18 | End: 2017-09-17

## 2017-08-18 RX ADMIN — Medication 81 MILLIGRAM(S): at 11:43

## 2017-08-18 RX ADMIN — Medication 1000 UNIT(S): at 11:43

## 2017-08-18 RX ADMIN — ENOXAPARIN SODIUM 40 MILLIGRAM(S): 100 INJECTION SUBCUTANEOUS at 11:43

## 2017-08-18 RX ADMIN — MEMANTINE HYDROCHLORIDE 5 MILLIGRAM(S): 10 TABLET ORAL at 06:37

## 2017-08-18 NOTE — CHART NOTE - NSCHARTNOTEFT_GEN_A_CORE
Patient seen earlier today; ambulated with PT for 100 feet with walker; OOB to chair; No hallucination; d/w wife at bedside     Vital Signs Last 24 Hrs: stable  T(C): 36.8 (18 Aug 2017 07:09), Max: 36.8 (18 Aug 2017 07:09)  T(F): 98.2 (18 Aug 2017 07:09), Max: 98.2 (18 Aug 2017 07:09)  HR: 67 (18 Aug 2017 10:45) (63 - 69)  BP: 110/67 (18 Aug 2017 10:45) (106/63 - 110/67)  RR: 16 (18 Aug 2017 07:09) (16 - 16)  SpO2: 97% (18 Aug 2017 10:45) (95% - 97%)    labs: stable 8/17    P/E:  Clinically unchanged    Plan:  Patient medically stable for discharge g=home; See discharge note for details.  discuused with PGY1/RN  Discussed with Wife at bedside    (Spend 36 mins with >50% spend counseling and cordination of care and d/w Patient, family and providers)

## 2017-08-22 DIAGNOSIS — E78.5 HYPERLIPIDEMIA, UNSPECIFIED: ICD-10-CM

## 2017-08-22 DIAGNOSIS — F02.80 DEMENTIA IN OTHER DISEASES CLASSIFIED ELSEWHERE, UNSPECIFIED SEVERITY, WITHOUT BEHAVIORAL DISTURBANCE, PSYCHOTIC DISTURBANCE, MOOD DISTURBANCE, AND ANXIETY: ICD-10-CM

## 2017-08-22 DIAGNOSIS — J44.9 CHRONIC OBSTRUCTIVE PULMONARY DISEASE, UNSPECIFIED: ICD-10-CM

## 2017-08-22 DIAGNOSIS — N17.9 ACUTE KIDNEY FAILURE, UNSPECIFIED: ICD-10-CM

## 2017-08-22 DIAGNOSIS — E86.0 DEHYDRATION: ICD-10-CM

## 2017-08-22 DIAGNOSIS — N39.0 URINARY TRACT INFECTION, SITE NOT SPECIFIED: ICD-10-CM

## 2017-08-22 DIAGNOSIS — Z66 DO NOT RESUSCITATE: ICD-10-CM

## 2017-08-22 DIAGNOSIS — I25.10 ATHEROSCLEROTIC HEART DISEASE OF NATIVE CORONARY ARTERY WITHOUT ANGINA PECTORIS: ICD-10-CM

## 2017-08-22 DIAGNOSIS — N40.0 BENIGN PROSTATIC HYPERPLASIA WITHOUT LOWER URINARY TRACT SYMPTOMS: ICD-10-CM

## 2017-08-22 DIAGNOSIS — N18.9 CHRONIC KIDNEY DISEASE, UNSPECIFIED: ICD-10-CM

## 2017-08-22 DIAGNOSIS — F17.210 NICOTINE DEPENDENCE, CIGARETTES, UNCOMPLICATED: ICD-10-CM

## 2017-08-22 DIAGNOSIS — J18.9 PNEUMONIA, UNSPECIFIED ORGANISM: ICD-10-CM

## 2017-08-22 DIAGNOSIS — Z96.643 PRESENCE OF ARTIFICIAL HIP JOINT, BILATERAL: ICD-10-CM

## 2017-08-22 DIAGNOSIS — G30.9 ALZHEIMER'S DISEASE, UNSPECIFIED: ICD-10-CM

## 2017-08-22 DIAGNOSIS — G93.40 ENCEPHALOPATHY, UNSPECIFIED: ICD-10-CM

## 2017-08-22 DIAGNOSIS — I95.9 HYPOTENSION, UNSPECIFIED: ICD-10-CM

## 2017-08-22 DIAGNOSIS — Z85.51 PERSONAL HISTORY OF MALIGNANT NEOPLASM OF BLADDER: ICD-10-CM

## 2017-08-23 LAB — HEV IGM SER QL: SIGNIFICANT CHANGE UP

## 2017-09-13 ENCOUNTER — APPOINTMENT (OUTPATIENT)
Dept: THORACIC SURGERY | Facility: CLINIC | Age: 82
End: 2017-09-13
Payer: MEDICARE

## 2017-09-13 VITALS
TEMPERATURE: 98.1 F | HEIGHT: 65 IN | OXYGEN SATURATION: 97 % | SYSTOLIC BLOOD PRESSURE: 94 MMHG | HEART RATE: 84 BPM | DIASTOLIC BLOOD PRESSURE: 70 MMHG

## 2017-09-13 PROCEDURE — 99214 OFFICE O/P EST MOD 30 MIN: CPT

## 2017-09-18 ENCOUNTER — INPATIENT (INPATIENT)
Facility: HOSPITAL | Age: 82
LOS: 3 days | Discharge: HOME HEALTH | DRG: 872 | End: 2017-09-22
Attending: INTERNAL MEDICINE | Admitting: INTERNAL MEDICINE
Payer: COMMERCIAL

## 2017-09-18 VITALS
TEMPERATURE: 100 F | RESPIRATION RATE: 19 BRPM | HEIGHT: 69 IN | OXYGEN SATURATION: 92 % | SYSTOLIC BLOOD PRESSURE: 84 MMHG | DIASTOLIC BLOOD PRESSURE: 65 MMHG | WEIGHT: 130.07 LBS | HEART RATE: 80 BPM

## 2017-09-18 DIAGNOSIS — G30.9 ALZHEIMER'S DISEASE, UNSPECIFIED: ICD-10-CM

## 2017-09-18 DIAGNOSIS — Z29.9 ENCOUNTER FOR PROPHYLACTIC MEASURES, UNSPECIFIED: ICD-10-CM

## 2017-09-18 DIAGNOSIS — N39.0 URINARY TRACT INFECTION, SITE NOT SPECIFIED: ICD-10-CM

## 2017-09-18 DIAGNOSIS — D30.3 BENIGN NEOPLASM OF BLADDER: Chronic | ICD-10-CM

## 2017-09-18 DIAGNOSIS — A41.9 SEPSIS, UNSPECIFIED ORGANISM: ICD-10-CM

## 2017-09-18 DIAGNOSIS — Z71.89 OTHER SPECIFIED COUNSELING: ICD-10-CM

## 2017-09-18 DIAGNOSIS — I71.4 ABDOMINAL AORTIC ANEURYSM, WITHOUT RUPTURE: Chronic | ICD-10-CM

## 2017-09-18 DIAGNOSIS — R62.7 ADULT FAILURE TO THRIVE: ICD-10-CM

## 2017-09-18 DIAGNOSIS — Z87.81 PERSONAL HISTORY OF (HEALED) TRAUMATIC FRACTURE: Chronic | ICD-10-CM

## 2017-09-18 DIAGNOSIS — Z96.643 PRESENCE OF ARTIFICIAL HIP JOINT, BILATERAL: Chronic | ICD-10-CM

## 2017-09-18 LAB
ALBUMIN SERPL ELPH-MCNC: 2.3 G/DL — LOW (ref 3.5–5)
ALP SERPL-CCNC: 104 U/L — SIGNIFICANT CHANGE UP (ref 40–120)
ALT FLD-CCNC: 66 U/L DA — HIGH (ref 10–60)
ANION GAP SERPL CALC-SCNC: 6 MMOL/L — SIGNIFICANT CHANGE UP (ref 5–17)
APPEARANCE UR: ABNORMAL
APTT BLD: 34.6 SEC — SIGNIFICANT CHANGE UP (ref 27.5–37.4)
AST SERPL-CCNC: 62 U/L — HIGH (ref 10–40)
BASOPHILS # BLD AUTO: 0.2 K/UL — SIGNIFICANT CHANGE UP (ref 0–0.2)
BASOPHILS NFR BLD AUTO: 0.9 % — SIGNIFICANT CHANGE UP (ref 0–2)
BILIRUB SERPL-MCNC: 1.1 MG/DL — SIGNIFICANT CHANGE UP (ref 0.2–1.2)
BILIRUB UR-MCNC: NEGATIVE — SIGNIFICANT CHANGE UP
BUN SERPL-MCNC: 17 MG/DL — SIGNIFICANT CHANGE UP (ref 7–18)
CALCIUM SERPL-MCNC: 8.8 MG/DL — SIGNIFICANT CHANGE UP (ref 8.4–10.5)
CHLORIDE SERPL-SCNC: 101 MMOL/L — SIGNIFICANT CHANGE UP (ref 96–108)
CO2 SERPL-SCNC: 28 MMOL/L — SIGNIFICANT CHANGE UP (ref 22–31)
COLOR SPEC: YELLOW — SIGNIFICANT CHANGE UP
CREAT SERPL-MCNC: 1.01 MG/DL — SIGNIFICANT CHANGE UP (ref 0.5–1.3)
DIFF PNL FLD: ABNORMAL
EOSINOPHIL # BLD AUTO: 0.2 K/UL — SIGNIFICANT CHANGE UP (ref 0–0.5)
EOSINOPHIL NFR BLD AUTO: 1 % — SIGNIFICANT CHANGE UP (ref 0–6)
GLUCOSE SERPL-MCNC: 103 MG/DL — HIGH (ref 70–99)
GLUCOSE UR QL: NEGATIVE — SIGNIFICANT CHANGE UP
HCT VFR BLD CALC: 41.8 % — SIGNIFICANT CHANGE UP (ref 39–50)
HGB BLD-MCNC: 13.5 G/DL — SIGNIFICANT CHANGE UP (ref 13–17)
INR BLD: 1.39 RATIO — HIGH (ref 0.88–1.16)
KETONES UR-MCNC: NEGATIVE — SIGNIFICANT CHANGE UP
LACTATE SERPL-SCNC: 1.6 MMOL/L — SIGNIFICANT CHANGE UP (ref 0.7–2)
LEUKOCYTE ESTERASE UR-ACNC: ABNORMAL
LYMPHOCYTES # BLD AUTO: 1.1 K/UL — SIGNIFICANT CHANGE UP (ref 1–3.3)
LYMPHOCYTES # BLD AUTO: 6 % — LOW (ref 13–44)
MCHC RBC-ENTMCNC: 30.7 PG — SIGNIFICANT CHANGE UP (ref 27–34)
MCHC RBC-ENTMCNC: 32.3 GM/DL — SIGNIFICANT CHANGE UP (ref 32–36)
MCV RBC AUTO: 94.8 FL — SIGNIFICANT CHANGE UP (ref 80–100)
MONOCYTES # BLD AUTO: 0.8 K/UL — SIGNIFICANT CHANGE UP (ref 0–0.9)
MONOCYTES NFR BLD AUTO: 4.2 % — SIGNIFICANT CHANGE UP (ref 2–14)
NEUTROPHILS # BLD AUTO: 16.8 K/UL — HIGH (ref 1.8–7.4)
NEUTROPHILS NFR BLD AUTO: 87.9 % — HIGH (ref 43–77)
NITRITE UR-MCNC: NEGATIVE — SIGNIFICANT CHANGE UP
PH UR: 7 — SIGNIFICANT CHANGE UP (ref 5–8)
PLATELET # BLD AUTO: 422 K/UL — HIGH (ref 150–400)
POTASSIUM SERPL-MCNC: 4.3 MMOL/L — SIGNIFICANT CHANGE UP (ref 3.5–5.3)
POTASSIUM SERPL-SCNC: 4.3 MMOL/L — SIGNIFICANT CHANGE UP (ref 3.5–5.3)
PROT SERPL-MCNC: 8 G/DL — SIGNIFICANT CHANGE UP (ref 6–8.3)
PROT UR-MCNC: 15
PROTHROM AB SERPL-ACNC: 15.3 SEC — HIGH (ref 9.8–12.7)
RBC # BLD: 4.41 M/UL — SIGNIFICANT CHANGE UP (ref 4.2–5.8)
RBC # FLD: 14.5 % — SIGNIFICANT CHANGE UP (ref 10.3–14.5)
SODIUM SERPL-SCNC: 135 MMOL/L — SIGNIFICANT CHANGE UP (ref 135–145)
SP GR SPEC: 1.01 — SIGNIFICANT CHANGE UP (ref 1.01–1.02)
UROBILINOGEN FLD QL: NEGATIVE — SIGNIFICANT CHANGE UP
WBC # BLD: 19.1 K/UL — HIGH (ref 3.8–10.5)
WBC # FLD AUTO: 19.1 K/UL — HIGH (ref 3.8–10.5)

## 2017-09-18 PROCEDURE — 99285 EMERGENCY DEPT VISIT HI MDM: CPT | Mod: 25

## 2017-09-18 PROCEDURE — 71010: CPT | Mod: 26

## 2017-09-18 RX ORDER — SIMVASTATIN 20 MG/1
20 TABLET, FILM COATED ORAL AT BEDTIME
Qty: 0 | Refills: 0 | Status: DISCONTINUED | OUTPATIENT
Start: 2017-09-18 | End: 2017-09-22

## 2017-09-18 RX ORDER — CHOLECALCIFEROL (VITAMIN D3) 125 MCG
1000 CAPSULE ORAL DAILY
Qty: 0 | Refills: 0 | Status: DISCONTINUED | OUTPATIENT
Start: 2017-09-18 | End: 2017-09-22

## 2017-09-18 RX ORDER — SODIUM CHLORIDE 9 MG/ML
1000 INJECTION INTRAMUSCULAR; INTRAVENOUS; SUBCUTANEOUS
Qty: 0 | Refills: 0 | Status: DISCONTINUED | OUTPATIENT
Start: 2017-09-18 | End: 2017-09-18

## 2017-09-18 RX ORDER — ASPIRIN/CALCIUM CARB/MAGNESIUM 324 MG
81 TABLET ORAL DAILY
Qty: 0 | Refills: 0 | Status: DISCONTINUED | OUTPATIENT
Start: 2017-09-18 | End: 2017-09-22

## 2017-09-18 RX ORDER — SODIUM CHLORIDE 9 MG/ML
1000 INJECTION INTRAMUSCULAR; INTRAVENOUS; SUBCUTANEOUS
Qty: 0 | Refills: 0 | Status: DISCONTINUED | OUTPATIENT
Start: 2017-09-18 | End: 2017-09-19

## 2017-09-18 RX ORDER — SODIUM CHLORIDE 9 MG/ML
3 INJECTION INTRAMUSCULAR; INTRAVENOUS; SUBCUTANEOUS ONCE
Qty: 0 | Refills: 0 | Status: COMPLETED | OUTPATIENT
Start: 2017-09-18 | End: 2017-09-18

## 2017-09-18 RX ORDER — CEFTRIAXONE 500 MG/1
1 INJECTION, POWDER, FOR SOLUTION INTRAMUSCULAR; INTRAVENOUS EVERY 24 HOURS
Qty: 0 | Refills: 0 | Status: DISCONTINUED | OUTPATIENT
Start: 2017-09-19 | End: 2017-09-22

## 2017-09-18 RX ORDER — MEMANTINE HYDROCHLORIDE 10 MG/1
5 TABLET ORAL
Qty: 0 | Refills: 0 | Status: DISCONTINUED | OUTPATIENT
Start: 2017-09-18 | End: 2017-09-22

## 2017-09-18 RX ORDER — CEFTRIAXONE 500 MG/1
INJECTION, POWDER, FOR SOLUTION INTRAMUSCULAR; INTRAVENOUS
Qty: 0 | Refills: 0 | Status: DISCONTINUED | OUTPATIENT
Start: 2017-09-18 | End: 2017-09-22

## 2017-09-18 RX ORDER — SODIUM CHLORIDE 9 MG/ML
500 INJECTION INTRAMUSCULAR; INTRAVENOUS; SUBCUTANEOUS ONCE
Qty: 0 | Refills: 0 | Status: COMPLETED | OUTPATIENT
Start: 2017-09-18 | End: 2017-09-18

## 2017-09-18 RX ORDER — SODIUM CHLORIDE 9 MG/ML
1750 INJECTION INTRAMUSCULAR; INTRAVENOUS; SUBCUTANEOUS ONCE
Qty: 0 | Refills: 0 | Status: COMPLETED | OUTPATIENT
Start: 2017-09-18 | End: 2017-09-18

## 2017-09-18 RX ORDER — ENOXAPARIN SODIUM 100 MG/ML
40 INJECTION SUBCUTANEOUS DAILY
Qty: 0 | Refills: 0 | Status: DISCONTINUED | OUTPATIENT
Start: 2017-09-18 | End: 2017-09-22

## 2017-09-18 RX ORDER — CEFTRIAXONE 500 MG/1
1 INJECTION, POWDER, FOR SOLUTION INTRAMUSCULAR; INTRAVENOUS ONCE
Qty: 0 | Refills: 0 | Status: COMPLETED | OUTPATIENT
Start: 2017-09-18 | End: 2017-09-18

## 2017-09-18 RX ORDER — DONEPEZIL HYDROCHLORIDE 10 MG/1
5 TABLET, FILM COATED ORAL AT BEDTIME
Qty: 0 | Refills: 0 | Status: DISCONTINUED | OUTPATIENT
Start: 2017-09-18 | End: 2017-09-22

## 2017-09-18 RX ADMIN — DONEPEZIL HYDROCHLORIDE 5 MILLIGRAM(S): 10 TABLET, FILM COATED ORAL at 23:35

## 2017-09-18 RX ADMIN — SODIUM CHLORIDE 1000 MILLILITER(S): 9 INJECTION INTRAMUSCULAR; INTRAVENOUS; SUBCUTANEOUS at 13:32

## 2017-09-18 RX ADMIN — Medication 1000 UNIT(S): at 13:31

## 2017-09-18 RX ADMIN — ENOXAPARIN SODIUM 40 MILLIGRAM(S): 100 INJECTION SUBCUTANEOUS at 13:32

## 2017-09-18 RX ADMIN — SODIUM CHLORIDE 2625 MILLILITER(S): 9 INJECTION INTRAMUSCULAR; INTRAVENOUS; SUBCUTANEOUS at 06:47

## 2017-09-18 RX ADMIN — Medication 1 TABLET(S): at 13:31

## 2017-09-18 RX ADMIN — SIMVASTATIN 20 MILLIGRAM(S): 20 TABLET, FILM COATED ORAL at 23:35

## 2017-09-18 RX ADMIN — SODIUM CHLORIDE 150 MILLILITER(S): 9 INJECTION INTRAMUSCULAR; INTRAVENOUS; SUBCUTANEOUS at 06:44

## 2017-09-18 RX ADMIN — MEMANTINE HYDROCHLORIDE 5 MILLIGRAM(S): 10 TABLET ORAL at 20:20

## 2017-09-18 RX ADMIN — SODIUM CHLORIDE 3 MILLILITER(S): 9 INJECTION INTRAMUSCULAR; INTRAVENOUS; SUBCUTANEOUS at 06:43

## 2017-09-18 RX ADMIN — SODIUM CHLORIDE 100 MILLILITER(S): 9 INJECTION INTRAMUSCULAR; INTRAVENOUS; SUBCUTANEOUS at 13:33

## 2017-09-18 RX ADMIN — Medication 81 MILLIGRAM(S): at 13:32

## 2017-09-18 RX ADMIN — CEFTRIAXONE 100 GRAM(S): 500 INJECTION, POWDER, FOR SOLUTION INTRAMUSCULAR; INTRAVENOUS at 13:31

## 2017-09-18 RX ADMIN — SODIUM CHLORIDE 150 MILLILITER(S): 9 INJECTION INTRAMUSCULAR; INTRAVENOUS; SUBCUTANEOUS at 06:45

## 2017-09-18 NOTE — ED PROVIDER NOTE - MEDICAL DECISION MAKING DETAILS
Pt requires admission for IV hydration.  Pt was prescribed Ceftin for UTI, will start Ceftriaxone IVPB, UA and UC/S ordered. MAR and Dr. Shirley endorsed. Pt agrees with admission. I had a detailed discussion with the patient and/or guardian regarding the historical points, exam findings, and any diagnostic results supporting the admit diagnosis.

## 2017-09-18 NOTE — ED PROVIDER NOTE - OBJECTIVE STATEMENT
Pt is w/o complaints, oriented to self and . Wife states pt with progressive weakness and refusing to eat sufficiently for past 2 weeks. No chest pain, no shortness of breath, no reported fever. No abdominal pain.

## 2017-09-18 NOTE — H&P ADULT - NSHPSOCIALHISTORY_GEN_ALL_CORE
Lives with wife in an apartment, walks independently at baseline.  Former smoker, per wife, patient smoked 2 packs per day initially for 30-40 years and gradually decreased, quit 1 years ago.  Social drinker, no drug abuse as per wife.

## 2017-09-18 NOTE — H&P ADULT - PROBLEM SELECTOR PLAN 3
Continue with home medications Namenda and Aricept. Dose was adjusted on last admission. Could be likely secondary to underlying infection.  Ensure proper hydration and nutrition with supplements.  Nutrition team consult. Calorie count.

## 2017-09-18 NOTE — ED ADULT NURSE REASSESSMENT NOTE - NS ED NURSE REASSESS COMMENT FT1
Pt. is a&Ox2 at baseline as per wife. Pt. is smiling and interactive. Pt. denies any pain. fluids given as per order.

## 2017-09-18 NOTE — H&P ADULT - RS GEN PE MLT RESP DETAILS PC
good air movement/clear to auscultation bilaterally/airway patent/breath sounds equal/respirations non-labored/normal

## 2017-09-18 NOTE — H&P ADULT - HISTORY OF PRESENT ILLNESS
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 Patient was recently admitted from [facility] last month for similar complain and was treated for UTI. Patient is a poor historian. Dont know why he is here and denies any complains including any chest pain, sob, palpitations, abdominal pain, nausea, vomiting, headache, fever, recent travel or trauma. Denies any changes in BM. Denies any urinary complains including burning, dysuria or frequency. Called wife who provided a detailed history. Per wife, patient has been having difficulty in getting up from chair for couple of weeks along with unsteady gait, increased fatigue, has been sleeping more lately and poor oral intake. Was seen by outpatient urologist Dr. De Santiago last week who said he has UTI and was given Cefuroxime 250 mg twice daily for 10 days which he started on last Tuesday Yesterday patient was very unstable on feet and was having difficulty getting up from bed, wet his bed as could not walk to bathroom at midnight, and so wife called EMS.     In ED, upon admission T of 99.9, HR of 80, BP 84/65, RR 19, spO2 92% on room air. 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 Atelectasis lung bases. No pleural effusion. Labs significant for elevated wbc of 19.1; Lactate was wnl, 1.6;, UA positive for wbc >50, moderate Leukocyte esterase. 2 L bolus given is ED. Urine and blood cultures sent and started on Rocephin.

## 2017-09-18 NOTE — H&P ADULT - PROBLEM SELECTOR PLAN 2
Could be likely secondary to underlying infection.  Ensure proper hydration and nutrition with supplements.  Nutrition team consult. Calorie count. SIRS criteria 2/4 positive, given HR> 90 and elevated wbc, + UTI  qSOFA score of 1 given hypotension.  Lactate wnl.  f/u Urine and blood cultures.  Started on Rocephin.  s/p 2 L bolus in ED, continue with IV hydration.

## 2017-09-18 NOTE — ED ADULT TRIAGE NOTE - CHIEF COMPLAINT QUOTE
c/o generalized weakness since 9 pm last night/no appetite as per wife c/o generalized weakness since 9 pm last night/no appetite as per wife /ALSO C/O PAIN IN HIS LEFT SIDE OF ABD

## 2017-09-18 NOTE — ED ADULT NURSE NOTE - OBJECTIVE STATEMENT
Pt. wife at bedside. wife stated that  has generalized weakness that started this morning. Pt. is usually able to ambulate to the bathroom and is now unable to get up. wife states that pt. has been sleeping a lot and not eating in the past two weeks.

## 2017-09-18 NOTE — H&P ADULT - PROBLEM SELECTOR PLAN 1
patient has h/o multiple UTI in past.  Was recently started on Cefuroxime as outpatient on last Tuesday.  SIRS criteria 2/4 positive, given hypotension and elevated wbc.  qSOFA score of 1 given hypotension.  Lactate wnl.  f/u Urine and blood cultures.  Started on Rocephin.  s/p 2 L bolus in ED, continue with IV hydration. patient has h/o multiple UTI in past.  Was recently started on Cefuroxime as outpatient on last Tuesday.  SIRS criteria 2/4 positive  has hypotension and elevated wbc.  qSOFA score of 1 given hypotension.  Lactate wnl.  f/u Urine and blood cultures.  Started on Rocephin.  s/p 2 L bolus in ED, continue with IV hydration.

## 2017-09-18 NOTE — H&P ADULT - ASSESSMENT
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 Admitted for UTI.

## 2017-09-18 NOTE — H&P ADULT - PROBLEM SELECTOR PLAN 5
Discussed with wife in detail who is HCP. Patient is DNR/DNI. Do not want any aggressive measures. Improved VTE score >2. Lovenox for DVT ppx.

## 2017-09-18 NOTE — ED ADULT TRIAGE NOTE - CCCP TRG CHIEF CMPLNT
c/o gen weakness since 9 pm last night/no appetite as per wife/weakness weakness/c/o gen weakness since 9 pm last night/ ALSO C/O PAIN IN HIS LEFT SIDE OF ABD/no appetite as per wife

## 2017-09-18 NOTE — H&P ADULT - ATTENDING COMMENTS
Patient seen and examined. Patient's history, vital's, labs, imaging studies reviewed. Discussed with above resident, agree with note with edits. Plan of care discussed with patient, and agrees, all questions answered.   Johana Shirley MD  9/18/2017

## 2017-09-18 NOTE — H&P ADULT - PROBLEM SELECTOR PLAN 4
Improved VTE score >2. Lovenox for DVT ppx. Continue with home medications Namenda and Aricept. Dose was adjusted on last admission.

## 2017-09-19 LAB
ANION GAP SERPL CALC-SCNC: 8 MMOL/L — SIGNIFICANT CHANGE UP (ref 5–17)
BUN SERPL-MCNC: 10 MG/DL — SIGNIFICANT CHANGE UP (ref 7–18)
CALCIUM SERPL-MCNC: 7.9 MG/DL — LOW (ref 8.4–10.5)
CHLORIDE SERPL-SCNC: 106 MMOL/L — SIGNIFICANT CHANGE UP (ref 96–108)
CO2 SERPL-SCNC: 26 MMOL/L — SIGNIFICANT CHANGE UP (ref 22–31)
CREAT SERPL-MCNC: 0.7 MG/DL — SIGNIFICANT CHANGE UP (ref 0.5–1.3)
CULTURE RESULTS: NO GROWTH — SIGNIFICANT CHANGE UP
GLUCOSE SERPL-MCNC: 91 MG/DL — SIGNIFICANT CHANGE UP (ref 70–99)
HCT VFR BLD CALC: 42.6 % — SIGNIFICANT CHANGE UP (ref 39–50)
HGB BLD-MCNC: 13.7 G/DL — SIGNIFICANT CHANGE UP (ref 13–17)
LYMPHOCYTES # BLD AUTO: 3 % — LOW (ref 13–44)
MAGNESIUM SERPL-MCNC: 2.2 MG/DL — SIGNIFICANT CHANGE UP (ref 1.6–2.6)
MCHC RBC-ENTMCNC: 30.2 PG — SIGNIFICANT CHANGE UP (ref 27–34)
MCHC RBC-ENTMCNC: 32.3 GM/DL — SIGNIFICANT CHANGE UP (ref 32–36)
MCV RBC AUTO: 93.5 FL — SIGNIFICANT CHANGE UP (ref 80–100)
MONOCYTES NFR BLD AUTO: 3 % — SIGNIFICANT CHANGE UP (ref 2–14)
NEUTROPHILS NFR BLD AUTO: 94 % — HIGH (ref 43–77)
PHOSPHATE SERPL-MCNC: 2.8 MG/DL — SIGNIFICANT CHANGE UP (ref 2.5–4.5)
PLATELET # BLD AUTO: 444 K/UL — HIGH (ref 150–400)
POTASSIUM SERPL-MCNC: 4.6 MMOL/L — SIGNIFICANT CHANGE UP (ref 3.5–5.3)
POTASSIUM SERPL-SCNC: 4.6 MMOL/L — SIGNIFICANT CHANGE UP (ref 3.5–5.3)
RBC # BLD: 4.55 M/UL — SIGNIFICANT CHANGE UP (ref 4.2–5.8)
RBC # FLD: 14.4 % — SIGNIFICANT CHANGE UP (ref 10.3–14.5)
SODIUM SERPL-SCNC: 140 MMOL/L — SIGNIFICANT CHANGE UP (ref 135–145)
SPECIMEN SOURCE: SIGNIFICANT CHANGE UP
WBC # BLD: 26.1 K/UL — HIGH (ref 3.8–10.5)
WBC # FLD AUTO: 26.1 K/UL — HIGH (ref 3.8–10.5)

## 2017-09-19 RX ORDER — INFLUENZA VIRUS VACCINE 15; 15; 15; 15 UG/.5ML; UG/.5ML; UG/.5ML; UG/.5ML
0.5 SUSPENSION INTRAMUSCULAR ONCE
Qty: 0 | Refills: 0 | Status: COMPLETED | OUTPATIENT
Start: 2017-09-19 | End: 2017-09-19

## 2017-09-19 RX ORDER — SODIUM CHLORIDE 9 MG/ML
1000 INJECTION INTRAMUSCULAR; INTRAVENOUS; SUBCUTANEOUS
Qty: 0 | Refills: 0 | Status: DISCONTINUED | OUTPATIENT
Start: 2017-09-19 | End: 2017-09-22

## 2017-09-19 RX ADMIN — SODIUM CHLORIDE 100 MILLILITER(S): 9 INJECTION INTRAMUSCULAR; INTRAVENOUS; SUBCUTANEOUS at 05:15

## 2017-09-19 RX ADMIN — ENOXAPARIN SODIUM 40 MILLIGRAM(S): 100 INJECTION SUBCUTANEOUS at 11:46

## 2017-09-19 RX ADMIN — MEMANTINE HYDROCHLORIDE 5 MILLIGRAM(S): 10 TABLET ORAL at 18:17

## 2017-09-19 RX ADMIN — SIMVASTATIN 20 MILLIGRAM(S): 20 TABLET, FILM COATED ORAL at 21:59

## 2017-09-19 RX ADMIN — Medication 1000 UNIT(S): at 11:45

## 2017-09-19 RX ADMIN — CEFTRIAXONE 100 GRAM(S): 500 INJECTION, POWDER, FOR SOLUTION INTRAMUSCULAR; INTRAVENOUS at 11:46

## 2017-09-19 RX ADMIN — DONEPEZIL HYDROCHLORIDE 5 MILLIGRAM(S): 10 TABLET, FILM COATED ORAL at 21:59

## 2017-09-19 RX ADMIN — Medication 1 TABLET(S): at 11:45

## 2017-09-19 RX ADMIN — MEMANTINE HYDROCHLORIDE 5 MILLIGRAM(S): 10 TABLET ORAL at 05:15

## 2017-09-19 RX ADMIN — Medication 81 MILLIGRAM(S): at 11:45

## 2017-09-19 RX ADMIN — SODIUM CHLORIDE 75 MILLILITER(S): 9 INJECTION INTRAMUSCULAR; INTRAVENOUS; SUBCUTANEOUS at 16:16

## 2017-09-19 NOTE — PROGRESS NOTE ADULT - PROBLEM SELECTOR PLAN 2
SIRS criteria 2/4 positive, given HR> 90 and elevated wbc, + UTI  subfebrile today  qSOFA score of 1 given hypotension.  Lactate wnl.  f/u Urine cultures.  Blood cultures negative.  Continue IV Rocephin for now.  s/p 2 L bolus in ED, continue with IV hydration.  ID consulted - Dr. Gong SIRS criteria 2/4 positive, given HR> 90 and elevated wbc to 26.1 today, + UTI  subfebrile today  qSOFA score of 1 given hypotension.  Lactate wnl.  f/u Urine cultures.  Blood cultures negative.  Continue IV Rocephin for now.  s/p 2 L bolus in ED, continue with IV hydration.  ID consulted - Dr. Gong

## 2017-09-19 NOTE — PHYSICAL THERAPY INITIAL EVALUATION ADULT - GENERAL OBSERVATIONS, REHAB EVAL
pt aox3 pleasant, resolve gait with rw support, transfers provided with standby assist for safe handling, tolerating oob to chair

## 2017-09-19 NOTE — PROGRESS NOTE ADULT - PROBLEM SELECTOR PLAN 3
Could be likely secondary to underlying infection.  Ensure proper hydration and nutrition with supplements.  Nutrition team consult. Calorie count.

## 2017-09-19 NOTE — PROGRESS NOTE ADULT - SUBJECTIVE AND OBJECTIVE BOX
Medicine attending note:       Patient is a 82 year old male who presents with a chief complaint of generalized weakness, poor oral intake (18 Sep 2017 12:07)      Patient seen and examined today at bedside. Patient denies any complaints at time of evaluation, but states he felt he had fever earlier today. Patient answers yes and no to questions asked. Patient is poor historian secondary to dementia.     MEDICATIONS  (STANDING):  cefTRIAXone   IVPB      sodium chloride 0.9%. 1000 milliLiter(s) (100 mL/Hr) IV Continuous <Continuous>  enoxaparin Injectable 40 milliGRAM(s) SubCutaneous daily  cefTRIAXone   IVPB 1 Gram(s) IV Intermittent every 24 hours  aspirin enteric coated 81 milliGRAM(s) Oral daily  simvastatin 20 milliGRAM(s) Oral at bedtime  donepezil 5 milliGRAM(s) Oral at bedtime  memantine 5 milliGRAM(s) Oral two times a day  multivitamin 1 Tablet(s) Oral daily  cholecalciferol 1000 Unit(s) Oral daily  influenza   Vaccine 0.5 milliLiter(s) IntraMuscular once      REVIEW OF SYSTEMS:  CONSTITUTIONAL: Positive for fever, fatigue  EYES: No eye pain, visual disturbances, or discharge  ENMT:  No difficulty hearing, tinnitus, vertigo; No sinus or throat pain  NECK: No pain or stiffness  RESPIRATORY: No cough, wheezing, chills or hemoptysis; No shortness of breath  CARDIOVASCULAR: No chest pain, palpitations, dizziness, or leg swelling  GASTROINTESTINAL: No abdominal or epigastric pain. No nausea, vomiting, or hematemesis; No diarrhea or constipation. No melena or hematochezia.  GENITOURINARY: No dysuria, frequency, hematuria, or incontinence  NEUROLOGICAL: No headaches, memory loss, loss of strength, numbness, or tremors  SKIN: No itching, burning, rashes, or lesions   LYMPH NODES: No enlarged glands  ENDOCRINE: No heat or cold intolerance; No hair loss  MUSCULOSKELETAL: No joint pain or swelling; No muscle, back, or extremity pain  PSYCHIATRIC: No depression, anxiety, mood swings, or difficulty sleeping  HEME/LYMPH: No easy bruising, or bleeding gums  ALLERY AND IMMUNOLOGIC: No hives or eczema  Patient is poor historian secondary to dementia, but answers yes and no to above questions.         PHYSICAL EXAM:    T(C): 37 (17 @ 14:21), Max: 37.3 (09-19-17 @ 05:05)  HR: 70 (17 @ 14:21) (70 - 90)  BP: 95/60 (17 @ 14:21) (95/60 - 120/88)  RR: 18 (17 @ 14:21) (17 - 18)  SpO2: 96% (17 @ 14:21) (95% - 96%)      GENERAL: Elderly male, NAD, well-groomed, well-developed  HEAD:  Atraumatic, Normocephalic  EYES: EOMI, PERRL, conjunctiva and sclera clear  ENMT: No tonsillar erythema, exudates, or enlargement; Moist mucous membranes, Good dentition, No lesions  NECK: Supple, No JVD, Normal thyroid  NERVOUS SYSTEM:  Alert & Oriented X1, Good concentration; no focal deficit  CHEST/LUNG: Clear to percussion bilaterally; No rales, rhonchi, wheezing, or rubs  HEART: Regular rate and rhythm; No murmurs, rubs, or gallops  ABDOMEN: Soft, Nontender, Nondistended; Bowel sounds present  EXTREMITIES:  2+ Peripheral Pulses, No clubbing, cyanosis, or edema  LYMPH: No lymphadenopathy noted  SKIN: No rashes or lesions    LABS:                        13.7   26.1  )-----------( 444      ( 19 Sep 2017 06:53 )             42.6         140  |  106  |  10  ----------------------------<  91  4.6   |  26  |  0.70    Ca    7.9<L>      19 Sep 2017 06:53  Phos  2.8       Mg     2.2         TPro  8.0  /  Alb  2.3<L>  /  TBili  1.1  /  DBili  x   /  AST  62<H>  /  ALT  66<H>  /  AlkPhos  104      PT/INR - ( 18 Sep 2017 06:59 )   PT: 15.3 sec;   INR: 1.39 ratio         PTT - ( 18 Sep 2017 06:59 )  PTT:34.6 sec  Urinalysis Basic - ( 18 Sep 2017 08:56 )    Color: Yellow / Appearance: x / S.015 / pH: x  Gluc: x / Ketone: Negative  / Bili: Negative / Urobili: Negative   Blood: x / Protein: 15 / Nitrite: Negative   Leuk Esterase: Moderate / RBC: 10-25 /HPF / WBC >50 /HPF   Sq Epi: x / Non Sq Epi: x / Bacteria: Moderate /HPF        Urinalysis Basic - ( 18 Sep 2017 08:56 )    Color: Yellow / Appearance: x / S.015 / pH: x  Gluc: x / Ketone: Negative  / Bili: Negative / Urobili: Negative   Blood: x / Protein: 15 / Nitrite: Negative   Leuk Esterase: Moderate / RBC: 10-25 /HPF / WBC >50 /HPF   Sq Epi: x / Non Sq Epi: x / Bacteria: Moderate /HPF            RADIOLOGY & ADDITIONAL TESTS:    Imaging Personally Reviewed:  [x ] YES  [ ] NO    Consultant(s) Notes Reviewed:  [x ] YES  [ ] NO    Care Discussed with Consultants/Other Providers [x ] YES  [ ] NO

## 2017-09-20 LAB
ANION GAP SERPL CALC-SCNC: 8 MMOL/L — SIGNIFICANT CHANGE UP (ref 5–17)
BUN SERPL-MCNC: 9 MG/DL — SIGNIFICANT CHANGE UP (ref 7–18)
CALCIUM SERPL-MCNC: 7.9 MG/DL — LOW (ref 8.4–10.5)
CHLORIDE SERPL-SCNC: 107 MMOL/L — SIGNIFICANT CHANGE UP (ref 96–108)
CO2 SERPL-SCNC: 24 MMOL/L — SIGNIFICANT CHANGE UP (ref 22–31)
CREAT SERPL-MCNC: 0.53 MG/DL — SIGNIFICANT CHANGE UP (ref 0.5–1.3)
GLUCOSE SERPL-MCNC: 74 MG/DL — SIGNIFICANT CHANGE UP (ref 70–99)
HCT VFR BLD CALC: 37.5 % — LOW (ref 39–50)
HGB BLD-MCNC: 13.1 G/DL — SIGNIFICANT CHANGE UP (ref 13–17)
MCHC RBC-ENTMCNC: 32.2 PG — SIGNIFICANT CHANGE UP (ref 27–34)
MCHC RBC-ENTMCNC: 34.8 GM/DL — SIGNIFICANT CHANGE UP (ref 32–36)
MCV RBC AUTO: 92.5 FL — SIGNIFICANT CHANGE UP (ref 80–100)
PLATELET # BLD AUTO: 385 K/UL — SIGNIFICANT CHANGE UP (ref 150–400)
POTASSIUM SERPL-MCNC: 4.1 MMOL/L — SIGNIFICANT CHANGE UP (ref 3.5–5.3)
POTASSIUM SERPL-SCNC: 4.1 MMOL/L — SIGNIFICANT CHANGE UP (ref 3.5–5.3)
RBC # BLD: 4.06 M/UL — LOW (ref 4.2–5.8)
RBC # FLD: 13.9 % — SIGNIFICANT CHANGE UP (ref 10.3–14.5)
SODIUM SERPL-SCNC: 139 MMOL/L — SIGNIFICANT CHANGE UP (ref 135–145)
WBC # BLD: 15.4 K/UL — HIGH (ref 3.8–10.5)
WBC # FLD AUTO: 15.4 K/UL — HIGH (ref 3.8–10.5)

## 2017-09-20 RX ADMIN — SIMVASTATIN 20 MILLIGRAM(S): 20 TABLET, FILM COATED ORAL at 22:46

## 2017-09-20 RX ADMIN — DONEPEZIL HYDROCHLORIDE 5 MILLIGRAM(S): 10 TABLET, FILM COATED ORAL at 22:46

## 2017-09-20 RX ADMIN — SODIUM CHLORIDE 75 MILLILITER(S): 9 INJECTION INTRAMUSCULAR; INTRAVENOUS; SUBCUTANEOUS at 06:17

## 2017-09-20 RX ADMIN — Medication 1000 UNIT(S): at 12:40

## 2017-09-20 RX ADMIN — Medication 1 TABLET(S): at 12:40

## 2017-09-20 RX ADMIN — ENOXAPARIN SODIUM 40 MILLIGRAM(S): 100 INJECTION SUBCUTANEOUS at 12:40

## 2017-09-20 RX ADMIN — MEMANTINE HYDROCHLORIDE 5 MILLIGRAM(S): 10 TABLET ORAL at 18:36

## 2017-09-20 RX ADMIN — Medication 81 MILLIGRAM(S): at 12:40

## 2017-09-20 RX ADMIN — CEFTRIAXONE 100 GRAM(S): 500 INJECTION, POWDER, FOR SOLUTION INTRAMUSCULAR; INTRAVENOUS at 12:38

## 2017-09-20 RX ADMIN — MEMANTINE HYDROCHLORIDE 5 MILLIGRAM(S): 10 TABLET ORAL at 06:14

## 2017-09-20 NOTE — CONSULT NOTE ADULT - ASSESSMENT
leukocytosis - improving  ? infection - his cultures are all negative,  doubt his urine is source as he has no specific urinary complaints either.  plan - cont rocephin 1 gm iv q24hrs for now .

## 2017-09-20 NOTE — PROGRESS NOTE ADULT - PROBLEM SELECTOR PLAN 1
patient has h/o multiple UTI in past.  Was recently started on Cefuroxime as outpatient on last Tuesday.  SIRS criteria 2/4 positive on admission  had hypotension and elevated wbc   qSOFA score of 1 given hypotension.  Lactate wnl.  f/u Urine cultures.  Blood cultures negative.  Continue IV Rocephin for now.  s/p 2 L bolus in ED, continue with IV hydration.  ID consulted - Dr. Gong

## 2017-09-20 NOTE — DIETITIAN INITIAL EVALUATION ADULT. - PROBLEM SELECTOR PLAN 1
patient has h/o multiple UTI in past.  Was recently started on Cefuroxime as outpatient on last Tuesday.  SIRS criteria 2/4 positive  has hypotension and elevated wbc.  qSOFA score of 1 given hypotension.  Lactate wnl.  f/u Urine and blood cultures.  Started on Rocephin.  s/p 2 L bolus in ED, continue with IV hydration.

## 2017-09-20 NOTE — CONSULT NOTE ADULT - SUBJECTIVE AND OBJECTIVE BOX
HPI:  82 years old male from home, AAO x 2, lives with wife, walks independently at baseline, PMH of Alzheimer's dementia, AAA (stable) , BPH, CKD, multiple UTI and PSHx of Bladder Tumor (s/p resection in May 2017) brought in by wife to ED via EMS for c/o generalized weakness and poor oral intake. Patient was recently admitted from [facility] last month for similar complain and was treated for UTI. Patient is a poor historian. Dont know why he is here and denies any complains including any chest pain, sob, palpitations, abdominal pain, nausea, vomiting, headache, fever, recent travel or trauma. Denies any changes in BM. Denies any urinary complains including burning, dysuria or frequency. Called wife who provided a detailed history. Per wife, patient has been having difficulty in getting up from chair for couple of weeks along with unsteady gait, increased fatigue, has been sleeping more lately and poor oral intake. Was seen by outpatient urologist Dr. De Santiago last week who said he has UTI and was given Cefuroxime 250 mg twice daily for 10 days which he started on last Tuesday Yesterday patient was very unstable on feet and was having difficulty getting up from bed, wet his bed as could not walk to bathroom at midnight, and so wife called EMS.     In ED, upon admission T of 99.9, HR of 80, BP 84/65, RR 19, spO2 92% on room air. 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 Atelectasis lung bases. No pleural effusion. Labs significant for elevated wbc of 19.1; Lactate was wnl, 1.6;, UA positive for wbc >50, moderate Leukocyte esterase. 2 L bolus given is ED. Urine and blood cultures sent and started on Rocephin. (18 Sep 2017 12:07)      PAST MEDICAL & SURGICAL HISTORY:  CAD (coronary artery disease): no obstructive  Lung nodule: right  Primary osteoarthritis of both hips  COPD (chronic obstructive pulmonary disease)  Cigarette nicotine dependence  Cataract: bilateral eyes  CKD (chronic kidney disease), stage 3 (moderate)  UTI (urinary tract infection)  Syncope: and near syncope  Vitamin D deficiency  AAA (abdominal aortic aneurysm)  HLD (hyperlipidemia)  BPH (benign prostatic hyperplasia)  Alzheimer disease  Hypercholesteremia  History of wrist fracture: s/p sx of right- 2006  Benign bladder tumor: s/p TURPT- 2006, July 2016  Abdominal aneurysm: repair- 2003  S/P hip replacement, bilateral: 2004, 2005  Bladder cancer: low grade  S/P wrist surgery  AAA (abdominal aortic aneurysm): treated surgically in 2007      No Known Allergies      Meds:  cefTRIAXone   IVPB      enoxaparin Injectable 40 milliGRAM(s) SubCutaneous daily  cefTRIAXone   IVPB 1 Gram(s) IV Intermittent every 24 hours  aspirin enteric coated 81 milliGRAM(s) Oral daily  simvastatin 20 milliGRAM(s) Oral at bedtime  donepezil 5 milliGRAM(s) Oral at bedtime  memantine 5 milliGRAM(s) Oral two times a day  multivitamin 1 Tablet(s) Oral daily  cholecalciferol 1000 Unit(s) Oral daily  influenza   Vaccine 0.5 milliLiter(s) IntraMuscular once  sodium chloride 0.9%. 1000 milliLiter(s) IV Continuous <Continuous>      SOCIAL HISTORY:  Smoker:  YES / NO        PACK YEARS:                         WHEN QUIT?  ETOH use:  YES / NO               FREQUENCY / QUANTITY:  Ilicit Drug use:  YES / NO  Occupation:  Assisted device use (Cane / Walker):  Live with:    FAMILY HISTORY:  Family history unknown      VITALS:  Vital Signs Last 24 Hrs  T(C): 36.8 (20 Sep 2017 05:21), Max: 37 (19 Sep 2017 14:21)  T(F): 98.2 (20 Sep 2017 05:21), Max: 98.6 (19 Sep 2017 14:21)  HR: 75 (20 Sep 2017 05:21) (62 - 76)  BP: 116/78 (20 Sep 2017 05:21) (95/60 - 119/74)  BP(mean): --  RR: 17 (20 Sep 2017 05:21) (17 - 18)  SpO2: 98% (20 Sep 2017 05:21) (96% - 98%)    LABS/DIAGNOSTIC TESTS:                          13.1   15.4  )-----------( 385      ( 20 Sep 2017 09:09 )             37.5     WBC Count: 15.4 K/uL (09-20 @ 09:09)  WBC Count: 26.1 K/uL (09-19 @ 06:53)  WBC Count: 19.1 K/uL (09-18 @ 06:59)      09-20    139  |  107  |  9   ----------------------------<  74  4.1   |  24  |  0.53    Ca    7.9<L>      20 Sep 2017 09:09  Phos  2.8     09-19  Mg     2.2     09-19                    LACTATE:    ABG -     CULTURES:       RADIOLOGY:      ROS  [  ] UNABLE TO ELICIT 82 years old male from home, AAO x 2, lives with wife, walks independently at baseline, PMH of Alzheimer's dementia, AAA (stable) , BPH, CKD, multiple UTI and PSHx of Bladder Tumor (s/p resection in May 2017) brought in by wife to ED for generalized weakness and poor oral intake. Was seen by outpatient urologist Dr. De Santiago last week and was on Cefuroxime 250 mg twice daily for 7/10 days       PAST MEDICAL & SURGICAL HISTORY:  CAD (coronary artery disease): no obstructive  Lung nodule: right  Primary osteoarthritis of both hips  COPD (chronic obstructive pulmonary disease)  Cigarette nicotine dependence  Cataract: bilateral eyes  CKD (chronic kidney disease), stage 3 (moderate)  UTI (urinary tract infection)  Syncope: and near syncope  Vitamin D deficiency  AAA (abdominal aortic aneurysm)  HLD (hyperlipidemia)  BPH (benign prostatic hyperplasia)  Alzheimer disease  Hypercholesteremia  History of wrist fracture: s/p sx of right- 2006  Benign bladder tumor: s/p TURPT- 2006, July 2016  Abdominal aneurysm: repair- 2003  S/P hip replacement, bilateral: 2004, 2005  Bladder cancer: low grade  S/P wrist surgery  AAA (abdominal aortic aneurysm): treated surgically in 2007      No Known Allergies      Meds:  cefTRIAXone   IVPB      enoxaparin Injectable 40 milliGRAM(s) SubCutaneous daily  cefTRIAXone   IVPB 1 Gram(s) IV Intermittent every 24 hours  aspirin enteric coated 81 milliGRAM(s) Oral daily  simvastatin 20 milliGRAM(s) Oral at bedtime  donepezil 5 milliGRAM(s) Oral at bedtime  memantine 5 milliGRAM(s) Oral two times a day  multivitamin 1 Tablet(s) Oral daily  cholecalciferol 1000 Unit(s) Oral daily  influenza   Vaccine 0.5 milliLiter(s) IntraMuscular once  sodium chloride 0.9%. 1000 milliLiter(s) IV Continuous <Continuous>      SOCIAL HISTORY:  Smoker:  YES / NO        PACK YEARS:                         WHEN QUIT?  ETOH use:  YES / NO               FREQUENCY / QUANTITY:  Ilicit Drug use:  YES / NO  Occupation:  Assisted device use (Cane / Walker):  Live with:    FAMILY HISTORY:  Family history unknown      VITALS:  Vital Signs Last 24 Hrs  T(C): 36.8 (20 Sep 2017 05:21), Max: 37 (19 Sep 2017 14:21)  T(F): 98.2 (20 Sep 2017 05:21), Max: 98.6 (19 Sep 2017 14:21)  HR: 75 (20 Sep 2017 05:21) (62 - 76)  BP: 116/78 (20 Sep 2017 05:21) (95/60 - 119/74)  BP(mean): --  RR: 17 (20 Sep 2017 05:21) (17 - 18)  SpO2: 98% (20 Sep 2017 05:21) (96% - 98%)    LABS/DIAGNOSTIC TESTS:                          13.1   15.4  )-----------( 385      ( 20 Sep 2017 09:09 )             37.5     WBC Count: 15.4 K/uL (09-20 @ 09:09)  WBC Count: 26.1 K/uL (09-19 @ 06:53)  WBC Count: 19.1 K/uL (09-18 @ 06:59)      09-20    139  |  107  |  9   ----------------------------<  74  4.1   |  24  |  0.53    Ca    7.9<L>      20 Sep 2017 09:09  Phos  2.8     09-19  Mg     2.2     09-19    Radiology:    < from: Xray Chest 1 View AP/PA (09.18.17 @ 07:37) >    EXAM:  CHEST SINGLE AP OR PA                            PROCEDURE DATE:  09/18/2017          INTERPRETATION:  CLINICAL STATEMENT: Chest pain.    TECHNIQUE: AP view of the chest.    COMPARISON: 8/13/2017    FINDINGS/  IMPRESSION:  Nonspecific density overlies left hilum without significant change likely   related to descending thoracic aneurysm.    Atelectasis lung bases. No pleural effusion    Heart size within normal limits.                  DAVID ALCANTARA M.D., ATTENDING RADIOLOGIST  This document has been electronically signed. Sep 18 2017  8:39AM                < end of copied text >        ROS  [  ] UNABLE TO ELICIT 82 years old male from home, AAO x 2, lives with wife, walks independently at baseline, PMH of Alzheimer's dementia, AAA (stable) , BPH, CKD, multiple UTI and PSHx of Bladder Tumor (s/p resection in May 2017) brought in by wife to ED for generalized weakness and poor oral intake. Was seen by outpatient urologist Dr. De Santiago last week and was on Cefuroxime 250 mg twice daily for 7/10 days       PAST MEDICAL & SURGICAL HISTORY:  CAD (coronary artery disease): no obstructive  Lung nodule: right  Primary osteoarthritis of both hips  COPD (chronic obstructive pulmonary disease)  Cigarette nicotine dependence  Cataract: bilateral eyes  CKD (chronic kidney disease), stage 3 (moderate)  UTI (urinary tract infection)  Syncope: and near syncope  Vitamin D deficiency  AAA (abdominal aortic aneurysm)  HLD (hyperlipidemia)  BPH (benign prostatic hyperplasia)  Alzheimer disease  Hypercholesteremia  History of wrist fracture: s/p sx of right- 2006  Benign bladder tumor: s/p TURPT- 2006, July 2016  Abdominal aneurysm: repair- 2003  S/P hip replacement, bilateral: 2004, 2005  Bladder cancer: low grade  S/P wrist surgery  AAA (abdominal aortic aneurysm): treated surgically in 2007      No Known Allergies      Meds:  cefTRIAXone   IVPB      enoxaparin Injectable 40 milliGRAM(s) SubCutaneous daily  cefTRIAXone   IVPB 1 Gram(s) IV Intermittent every 24 hours  aspirin enteric coated 81 milliGRAM(s) Oral daily  simvastatin 20 milliGRAM(s) Oral at bedtime  donepezil 5 milliGRAM(s) Oral at bedtime  memantine 5 milliGRAM(s) Oral two times a day  multivitamin 1 Tablet(s) Oral daily  cholecalciferol 1000 Unit(s) Oral daily  influenza   Vaccine 0.5 milliLiter(s) IntraMuscular once  sodium chloride 0.9%. 1000 milliLiter(s) IV Continuous <Continuous>      SOCIAL HISTORY:  Smoker:  YES / NO        PACK YEARS:                         WHEN QUIT?  ETOH use:  YES / NO               FREQUENCY / QUANTITY:  Ilicit Drug use:  YES / NO  Occupation:  Assisted device use (Cane / Walker):  Live with:    FAMILY HISTORY:  Family history unknown      VITALS:  Vital Signs Last 24 Hrs  T(C): 36.8 (20 Sep 2017 05:21), Max: 37 (19 Sep 2017 14:21)  T(F): 98.2 (20 Sep 2017 05:21), Max: 98.6 (19 Sep 2017 14:21)  HR: 75 (20 Sep 2017 05:21) (62 - 76)  BP: 116/78 (20 Sep 2017 05:21) (95/60 - 119/74)  BP(mean): --  RR: 17 (20 Sep 2017 05:21) (17 - 18)  SpO2: 98% (20 Sep 2017 05:21) (96% - 98%)    LABS/DIAGNOSTIC TESTS:                          13.1   15.4  )-----------( 385      ( 20 Sep 2017 09:09 )             37.5     WBC Count: 15.4 K/uL (09-20 @ 09:09)  WBC Count: 26.1 K/uL (09-19 @ 06:53)  WBC Count: 19.1 K/uL (09-18 @ 06:59)      09-20    139  |  107  |  9   ----------------------------<  74  4.1   |  24  |  0.53    Ca    7.9<L>      20 Sep 2017 09:09  Phos  2.8     09-19  Mg     2.2     09-19    Radiology:    < from: Xray Chest 1 View AP/PA (09.18.17 @ 07:37) >    EXAM:  CHEST SINGLE AP OR PA                            PROCEDURE DATE:  09/18/2017          INTERPRETATION:  CLINICAL STATEMENT: Chest pain.    TECHNIQUE: AP view of the chest.    COMPARISON: 8/13/2017    FINDINGS/  IMPRESSION:  Nonspecific density overlies left hilum without significant change likely   related to descending thoracic aneurysm.    Atelectasis lung bases. No pleural effusion    Heart size within normal limits.                  DAVID ALCANTARA M.D., ATTENDING RADIOLOGIST  This document has been electronically signed. Sep 18 2017  8:39AM                < end of copied text > 82 years old male from home, AAO x 2, lives with wife, walks independently at baseline, PMH of Alzheimer's dementia, AAA (stable) , BPH, CKD, multiple UTI and PSHx of Bladder Tumor (s/p resection in May 2017) brought in by wife to ED for generalized weakness and poor oral intake. Was seen by outpatient urologist Dr. De Santiago last week and was on Cefuroxime 250 mg twice daily for 7/10 days. Patient is afebrile and WBC is improving      PAST MEDICAL & SURGICAL HISTORY:  CAD (coronary artery disease): no obstructive  Lung nodule: right  Primary osteoarthritis of both hips  COPD (chronic obstructive pulmonary disease)  Cigarette nicotine dependence  Cataract: bilateral eyes  CKD (chronic kidney disease), stage 3 (moderate)  UTI (urinary tract infection)  Syncope: and near syncope  Vitamin D deficiency  AAA (abdominal aortic aneurysm)  HLD (hyperlipidemia)  BPH (benign prostatic hyperplasia)  Alzheimer disease  Hypercholesteremia  History of wrist fracture: s/p sx of right- 2006  Benign bladder tumor: s/p TURPT- 2006, July 2016  Abdominal aneurysm: repair- 2003  S/P hip replacement, bilateral: 2004, 2005  Bladder cancer: low grade  S/P wrist surgery  AAA (abdominal aortic aneurysm): treated surgically in 2007      No Known Allergies      Meds:  cefTRIAXone   IVPB      enoxaparin Injectable 40 milliGRAM(s) SubCutaneous daily  cefTRIAXone   IVPB 1 Gram(s) IV Intermittent every 24 hours  aspirin enteric coated 81 milliGRAM(s) Oral daily  simvastatin 20 milliGRAM(s) Oral at bedtime  donepezil 5 milliGRAM(s) Oral at bedtime  memantine 5 milliGRAM(s) Oral two times a day  multivitamin 1 Tablet(s) Oral daily  cholecalciferol 1000 Unit(s) Oral daily  influenza   Vaccine 0.5 milliLiter(s) IntraMuscular once  sodium chloride 0.9%. 1000 milliLiter(s) IV Continuous <Continuous>      SOCIAL HISTORY:  Smoker:  YES / NO        PACK YEARS:                         WHEN QUIT?  ETOH use:  YES / NO               FREQUENCY / QUANTITY:  Ilicit Drug use:  YES / NO  Occupation:  Assisted device use (Cane / Walker):  Live with:    FAMILY HISTORY:  Family history unknown      VITALS:  Vital Signs Last 24 Hrs  T(C): 36.8 (20 Sep 2017 05:21), Max: 37 (19 Sep 2017 14:21)  T(F): 98.2 (20 Sep 2017 05:21), Max: 98.6 (19 Sep 2017 14:21)  HR: 75 (20 Sep 2017 05:21) (62 - 76)  BP: 116/78 (20 Sep 2017 05:21) (95/60 - 119/74)  BP(mean): --  RR: 17 (20 Sep 2017 05:21) (17 - 18)  SpO2: 98% (20 Sep 2017 05:21) (96% - 98%)    LABS/DIAGNOSTIC TESTS:                          13.1   15.4  )-----------( 385      ( 20 Sep 2017 09:09 )             37.5     WBC Count: 15.4 K/uL (09-20 @ 09:09)  WBC Count: 26.1 K/uL (09-19 @ 06:53)  WBC Count: 19.1 K/uL (09-18 @ 06:59)      09-20    139  |  107  |  9   ----------------------------<  74  4.1   |  24  |  0.53    Ca    7.9<L>      20 Sep 2017 09:09  Phos  2.8     09-19  Mg     2.2     09-19    Radiology:    < from: Xray Chest 1 View AP/PA (09.18.17 @ 07:37) >    EXAM:  CHEST SINGLE AP OR PA                            PROCEDURE DATE:  09/18/2017          INTERPRETATION:  CLINICAL STATEMENT: Chest pain.    TECHNIQUE: AP view of the chest.    COMPARISON: 8/13/2017    FINDINGS/  IMPRESSION:  Nonspecific density overlies left hilum without significant change likely   related to descending thoracic aneurysm.    Atelectasis lung bases. No pleural effusion    Heart size within normal limits.                  DAVID ALCANTARA M.D., ATTENDING RADIOLOGIST  This document has been electronically signed. Sep 18 2017  8:39AM                < end of copied text > 82 years old male from home, AAO x 2, lives with wife, walks independently at baseline, PMH of Alzheimer's dementia, AAA (stable) , BPH, CKD, multiple UTI and PSHx of Bladder Tumor (s/p resection in May 2017) brought in by wife to ED for generalized weakness and poor oral intake. Was seen by outpatient urologist Dr. De Santiago last week and was on Cefuroxime 250 mg twice daily for 7/10 days. Patient is afebrile and WBC is improving      PAST MEDICAL & SURGICAL HISTORY:  CAD (coronary artery disease): no obstructive  Lung nodule: right  Primary osteoarthritis of both hips  COPD (chronic obstructive pulmonary disease)  Cigarette nicotine dependence  Cataract: bilateral eyes  CKD (chronic kidney disease), stage 3 (moderate)  UTI (urinary tract infection)  Syncope: and near syncope  Vitamin D deficiency  AAA (abdominal aortic aneurysm)  HLD (hyperlipidemia)  BPH (benign prostatic hyperplasia)  Alzheimer disease  Hypercholesteremia  History of wrist fracture: s/p sx of right- 2006  Benign bladder tumor: s/p TURPT- 2006, July 2016  Abdominal aneurysm: repair- 2003  S/P hip replacement, bilateral: 2004, 2005  Bladder cancer: low grade  S/P wrist surgery  AAA (abdominal aortic aneurysm): treated surgically in 2007      No Known Allergies      Meds:  cefTRIAXone   IVPB      enoxaparin Injectable 40 milliGRAM(s) SubCutaneous daily  cefTRIAXone   IVPB 1 Gram(s) IV Intermittent every 24 hours  aspirin enteric coated 81 milliGRAM(s) Oral daily  simvastatin 20 milliGRAM(s) Oral at bedtime  donepezil 5 milliGRAM(s) Oral at bedtime  memantine 5 milliGRAM(s) Oral two times a day  multivitamin 1 Tablet(s) Oral daily  cholecalciferol 1000 Unit(s) Oral daily  influenza   Vaccine 0.5 milliLiter(s) IntraMuscular once  sodium chloride 0.9%. 1000 milliLiter(s) IV Continuous <Continuous>      SOCIAL HISTORY:  Smoker:  Active smoker x 30 years ( now 2-3 cig/day)    FAMILY HISTORY:  Family history unknown      VITALS:  Vital Signs Last 24 Hrs  T(C): 36.8 (20 Sep 2017 05:21), Max: 37 (19 Sep 2017 14:21)  T(F): 98.2 (20 Sep 2017 05:21), Max: 98.6 (19 Sep 2017 14:21)  HR: 75 (20 Sep 2017 05:21) (62 - 76)  BP: 116/78 (20 Sep 2017 05:21) (95/60 - 119/74)  BP(mean): --  RR: 17 (20 Sep 2017 05:21) (17 - 18)  SpO2: 98% (20 Sep 2017 05:21) (96% - 98%)    LABS/DIAGNOSTIC TESTS:                          13.1   15.4  )-----------( 385      ( 20 Sep 2017 09:09 )             37.5     WBC Count: 15.4 K/uL (09-20 @ 09:09)  WBC Count: 26.1 K/uL (09-19 @ 06:53)  WBC Count: 19.1 K/uL (09-18 @ 06:59)      09-20    139  |  107  |  9   ----------------------------<  74  4.1   |  24  |  0.53    Ca    7.9<L>      20 Sep 2017 09:09  Phos  2.8     09-19  Mg     2.2     09-19    Radiology:    < from: Xray Chest 1 View AP/PA (09.18.17 @ 07:37) >    EXAM:  CHEST SINGLE AP OR PA                            PROCEDURE DATE:  09/18/2017          INTERPRETATION:  CLINICAL STATEMENT: Chest pain.    TECHNIQUE: AP view of the chest.    COMPARISON: 8/13/2017    FINDINGS/  IMPRESSION:  Nonspecific density overlies left hilum without significant change likely   related to descending thoracic aneurysm.    Atelectasis lung bases. No pleural effusion    Heart size within normal limits.                  DAVID ALCANTARA M.D., ATTENDING RADIOLOGIST  This document has been electronically signed. Sep 18 2017  8:39AM                < end of copied text > 82 years old male from home, AAO x 2, lives with wife, walks independently at baseline, PMH of Alzheimer's dementia, AAA (stable) , BPH, CKD, multiple UTI and PSHx of Bladder Tumor (s/p resection in May 2017) brought in by wife to ED for generalized weakness and poor oral intake. Was seen by outpatient urologist Dr. De Santiago last week and was on Cefuroxime 250 mg twice daily for 7/10 days. Patient is afebrile and WBC is improving here but he has no localising signs of infection. His u/a is positive but the urine culture is always neg . The patient himself offers no specific complaints except for weakness. His Tmax was 99.9 . Pt currently looks well but is mildly confused.      PAST MEDICAL & SURGICAL HISTORY:  CAD (coronary artery disease): no obstructive  Lung nodule: right  Primary osteoarthritis of both hips  COPD (chronic obstructive pulmonary disease)  Cigarette nicotine dependence  Cataract: bilateral eyes  CKD (chronic kidney disease), stage 3 (moderate)  UTI (urinary tract infection)  Syncope: and near syncope  Vitamin D deficiency  AAA (abdominal aortic aneurysm)  HLD (hyperlipidemia)  BPH (benign prostatic hyperplasia)  Alzheimer disease  Hypercholesteremia  History of wrist fracture: s/p sx of right- 2006  Benign bladder tumor: s/p TURPT- 2006, July 2016  Abdominal aneurysm: repair- 2003  S/P hip replacement, bilateral: 2004, 2005  Bladder cancer: low grade  S/P wrist surgery  AAA (abdominal aortic aneurysm): treated surgically in 2007      No Known Allergies      Meds:  cefTRIAXone   IVPB      enoxaparin Injectable 40 milliGRAM(s) SubCutaneous daily  cefTRIAXone   IVPB 1 Gram(s) IV Intermittent every 24 hours  aspirin enteric coated 81 milliGRAM(s) Oral daily  simvastatin 20 milliGRAM(s) Oral at bedtime  donepezil 5 milliGRAM(s) Oral at bedtime  memantine 5 milliGRAM(s) Oral two times a day  multivitamin 1 Tablet(s) Oral daily  cholecalciferol 1000 Unit(s) Oral daily  influenza   Vaccine 0.5 milliLiter(s) IntraMuscular once  sodium chloride 0.9%. 1000 milliLiter(s) IV Continuous <Continuous>      SOCIAL HISTORY:  Smoker:  Active smoker x 30 years ( now 2-3 cig/day)    FAMILY HISTORY:  Family history unknown      VITALS:  Vital Signs Last 24 Hrs  T(C): 36.8 (20 Sep 2017 05:21), Max: 37 (19 Sep 2017 14:21)  T(F): 98.2 (20 Sep 2017 05:21), Max: 98.6 (19 Sep 2017 14:21)  HR: 75 (20 Sep 2017 05:21) (62 - 76)  BP: 116/78 (20 Sep 2017 05:21) (95/60 - 119/74)  BP(mean): --  RR: 17 (20 Sep 2017 05:21) (17 - 18)  SpO2: 98% (20 Sep 2017 05:21) (96% - 98%)    LABS/DIAGNOSTIC TESTS:                          13.1   15.4  )-----------( 385      ( 20 Sep 2017 09:09 )             37.5     WBC Count: 15.4 K/uL (09-20 @ 09:09)  WBC Count: 26.1 K/uL (09-19 @ 06:53)  WBC Count: 19.1 K/uL (09-18 @ 06:59)      09-20    139  |  107  |  9   ----------------------------<  74  4.1   |  24  |  0.53    Ca    7.9<L>      20 Sep 2017 09:09  Phos  2.8     09-19  Mg     2.2     09-19    Radiology:    < from: Xray Chest 1 View AP/PA (09.18.17 @ 07:37) >    EXAM:  CHEST SINGLE AP OR PA                            PROCEDURE DATE:  09/18/2017          INTERPRETATION:  CLINICAL STATEMENT: Chest pain.    TECHNIQUE: AP view of the chest.    COMPARISON: 8/13/2017    FINDINGS/  IMPRESSION:  Nonspecific density overlies left hilum without significant change likely   related to descending thoracic aneurysm.    Atelectasis lung bases. No pleural effusion    Heart size within normal limits.                  DAVID ALCANTARA M.D., ATTENDING RADIOLOGIST  This document has been electronically signed. Sep 18 2017  8:39AM                < end of copied text >

## 2017-09-20 NOTE — PROGRESS NOTE ADULT - SUBJECTIVE AND OBJECTIVE BOX
Medicine attending note:       Patient is a 82 year old male who presents with a chief complaint of generalized weakness, poor oral intake (18 Sep 2017 12:07)      Patient seen and examined today at bedside. Patient denies any complaints at time of evaluation, but states he has no appetite today. Patient answers yes and no to questions asked. Patient is poor historian secondary to dementia.     MEDICATIONS  (STANDING):  cefTRIAXone   IVPB      enoxaparin Injectable 40 milliGRAM(s) SubCutaneous daily  cefTRIAXone   IVPB 1 Gram(s) IV Intermittent every 24 hours  aspirin enteric coated 81 milliGRAM(s) Oral daily  simvastatin 20 milliGRAM(s) Oral at bedtime  donepezil 5 milliGRAM(s) Oral at bedtime  memantine 5 milliGRAM(s) Oral two times a day  multivitamin 1 Tablet(s) Oral daily  cholecalciferol 1000 Unit(s) Oral daily  influenza   Vaccine 0.5 milliLiter(s) IntraMuscular once  sodium chloride 0.9%. 1000 milliLiter(s) (75 mL/Hr) IV Continuous <Continuous>        REVIEW OF SYSTEMS:  CONSTITUTIONAL: Positive for fatigue, poor appetite no fever, or chills  EYES: No eye pain, visual disturbances, or discharge  ENMT:  No difficulty hearing, tinnitus, vertigo; No sinus or throat pain  NECK: No pain or stiffness  RESPIRATORY: No cough, wheezing, chills or hemoptysis; No shortness of breath  CARDIOVASCULAR: No chest pain, palpitations, dizziness, or leg swelling  GASTROINTESTINAL: No abdominal or epigastric pain. No nausea, vomiting, or hematemesis; No diarrhea or constipation. No melena or hematochezia.  GENITOURINARY: No dysuria, frequency, hematuria, or incontinence  NEUROLOGICAL: No headaches, memory loss, loss of strength, numbness, or tremors  SKIN: No itching, burning, rashes, or lesions   LYMPH NODES: No enlarged glands  ENDOCRINE: No heat or cold intolerance; No hair loss  MUSCULOSKELETAL: No joint pain or swelling; No muscle, back, or extremity pain  PSYCHIATRIC: No depression, anxiety, mood swings, or difficulty sleeping  HEME/LYMPH: No easy bruising, or bleeding gums  ALLERY AND IMMUNOLOGIC: No hives or eczema  Patient is poor historian secondary to dementia, but answers yes and no to above questions.         PHYSICAL EXAM:    T(C): 36.8 (09-20-17 @ 05:21), Max: 37 (09-19-17 @ 14:21)  HR: 75 (09-20-17 @ 05:21) (62 - 75)  BP: 116/78 (09-20-17 @ 05:21) (95/60 - 119/74)  RR: 17 (09-20-17 @ 05:21) (17 - 18)  SpO2: 98% (09-20-17 @ 05:21) (96% - 98%)  I&O's Summary    19 Sep 2017 07:01  -  20 Sep 2017 07:00  --------------------------------------------------------  IN: 50 mL / OUT: 0 mL / NET: 50 mL      GENERAL: Elderly male, NAD, well-groomed, well-developed, sitting up in chair  HEAD:  Atraumatic, Normocephalic  EYES: EOMI, PERRL, conjunctiva and sclera clear  ENMT: No tonsillar erythema, exudates, or enlargement; Moist mucous membranes, Good dentition, No lesions  NECK: Supple, No JVD, Normal thyroid  NERVOUS SYSTEM:  Alert & Oriented X1, Good concentration; no focal deficit  CHEST/LUNG: Clear to percussion bilaterally; No rales, rhonchi, wheezing, or rubs  HEART: Regular rate and rhythm; No murmurs, rubs, or gallops  ABDOMEN: Soft, Nontender, Nondistended; Bowel sounds present  EXTREMITIES:  2+ Peripheral Pulses, No clubbing, cyanosis, or edema  LYMPH: No lymphadenopathy noted  SKIN: No rashes or lesions    LABS:              LABS:                        13.1   15.4  )-----------( 385      ( 20 Sep 2017 09:09 )             37.5     09-20    139  |  107  |  9   ----------------------------<  74  4.1   |  24  |  0.53    Ca    7.9<L>      20 Sep 2017 09:09  Phos  2.8     09-19  Mg     2.2     09-19              RADIOLOGY & ADDITIONAL TESTS:    Imaging Personally Reviewed:  [x ] YES  [ ] NO    Consultant(s) Notes Reviewed:  [x ] YES  [ ] NO    Care Discussed with Consultants/Other Providers [x ] YES  [ ] NO

## 2017-09-20 NOTE — DIETITIAN INITIAL EVALUATION ADULT. - PROBLEM SELECTOR PLAN 2
SIRS criteria 2/4 positive, given HR> 90 and elevated wbc, + UTI  qSOFA score of 1 given hypotension.  Lactate wnl.  f/u Urine and blood cultures.  Started on Rocephin.  s/p 2 L bolus in ED, continue with IV hydration.

## 2017-09-20 NOTE — DIETITIAN INITIAL EVALUATION ADULT. - OTHER INFO
Pt visiteded.OOb to daniela..Pt is alert but Confused.D/W RN  Appetite is improving. Today pt ate 100 % cereal, drank 100 % Ensure ,60 cc  of juice.Per pt he live with his wife.

## 2017-09-20 NOTE — CONSULT NOTE ADULT - RS GEN PE MLT RESP DETAILS PC
normal/breath sounds equal/clear to auscultation bilaterally/airway patent/good air movement/respirations non-labored good air movement/no rhonchi/breath sounds equal/clear to auscultation bilaterally/no rales

## 2017-09-20 NOTE — PROGRESS NOTE ADULT - PROBLEM SELECTOR PLAN 2
SIRS criteria 2/4 positive, given HR> 90 and elevated wbc to 26.1 today, + UTI  subfebrile today  qSOFA score of 1 given hypotension.  Lactate wnl.  f/u Urine cultures.  Blood cultures negative.  Continue IV Rocephin for now.  s/p 2 L bolus in ED, continue with IV hydration.  ID consulted - Dr. Gong

## 2017-09-21 DIAGNOSIS — Z02.9 ENCOUNTER FOR ADMINISTRATIVE EXAMINATIONS, UNSPECIFIED: ICD-10-CM

## 2017-09-21 LAB
ALBUMIN SERPL ELPH-MCNC: 2.3 G/DL — LOW (ref 3.5–5)
ALP SERPL-CCNC: 103 U/L — SIGNIFICANT CHANGE UP (ref 40–120)
ALT FLD-CCNC: 51 U/L DA — SIGNIFICANT CHANGE UP (ref 10–60)
ANION GAP SERPL CALC-SCNC: 7 MMOL/L — SIGNIFICANT CHANGE UP (ref 5–17)
AST SERPL-CCNC: 58 U/L — HIGH (ref 10–40)
BILIRUB SERPL-MCNC: 0.6 MG/DL — SIGNIFICANT CHANGE UP (ref 0.2–1.2)
BUN SERPL-MCNC: 12 MG/DL — SIGNIFICANT CHANGE UP (ref 7–18)
CALCIUM SERPL-MCNC: 8.4 MG/DL — SIGNIFICANT CHANGE UP (ref 8.4–10.5)
CHLORIDE SERPL-SCNC: 104 MMOL/L — SIGNIFICANT CHANGE UP (ref 96–108)
CO2 SERPL-SCNC: 26 MMOL/L — SIGNIFICANT CHANGE UP (ref 22–31)
CREAT SERPL-MCNC: 0.69 MG/DL — SIGNIFICANT CHANGE UP (ref 0.5–1.3)
GLUCOSE SERPL-MCNC: 117 MG/DL — HIGH (ref 70–99)
HCT VFR BLD CALC: 43.8 % — SIGNIFICANT CHANGE UP (ref 39–50)
HGB BLD-MCNC: 14.6 G/DL — SIGNIFICANT CHANGE UP (ref 13–17)
MCHC RBC-ENTMCNC: 31.1 PG — SIGNIFICANT CHANGE UP (ref 27–34)
MCHC RBC-ENTMCNC: 33.3 GM/DL — SIGNIFICANT CHANGE UP (ref 32–36)
MCV RBC AUTO: 93.5 FL — SIGNIFICANT CHANGE UP (ref 80–100)
PLATELET # BLD AUTO: 438 K/UL — HIGH (ref 150–400)
POTASSIUM SERPL-MCNC: 4.2 MMOL/L — SIGNIFICANT CHANGE UP (ref 3.5–5.3)
POTASSIUM SERPL-SCNC: 4.2 MMOL/L — SIGNIFICANT CHANGE UP (ref 3.5–5.3)
PROT SERPL-MCNC: 8.1 G/DL — SIGNIFICANT CHANGE UP (ref 6–8.3)
RBC # BLD: 4.69 M/UL — SIGNIFICANT CHANGE UP (ref 4.2–5.8)
RBC # FLD: 14.2 % — SIGNIFICANT CHANGE UP (ref 10.3–14.5)
SODIUM SERPL-SCNC: 137 MMOL/L — SIGNIFICANT CHANGE UP (ref 135–145)
WBC # BLD: 17.1 K/UL — HIGH (ref 3.8–10.5)
WBC # FLD AUTO: 17.1 K/UL — HIGH (ref 3.8–10.5)

## 2017-09-21 RX ADMIN — SIMVASTATIN 20 MILLIGRAM(S): 20 TABLET, FILM COATED ORAL at 21:31

## 2017-09-21 RX ADMIN — SODIUM CHLORIDE 75 MILLILITER(S): 9 INJECTION INTRAMUSCULAR; INTRAVENOUS; SUBCUTANEOUS at 11:15

## 2017-09-21 RX ADMIN — SODIUM CHLORIDE 75 MILLILITER(S): 9 INJECTION INTRAMUSCULAR; INTRAVENOUS; SUBCUTANEOUS at 05:26

## 2017-09-21 RX ADMIN — Medication 1 TABLET(S): at 11:13

## 2017-09-21 RX ADMIN — MEMANTINE HYDROCHLORIDE 5 MILLIGRAM(S): 10 TABLET ORAL at 05:25

## 2017-09-21 RX ADMIN — CEFTRIAXONE 100 GRAM(S): 500 INJECTION, POWDER, FOR SOLUTION INTRAMUSCULAR; INTRAVENOUS at 11:09

## 2017-09-21 RX ADMIN — DONEPEZIL HYDROCHLORIDE 5 MILLIGRAM(S): 10 TABLET, FILM COATED ORAL at 21:31

## 2017-09-21 RX ADMIN — ENOXAPARIN SODIUM 40 MILLIGRAM(S): 100 INJECTION SUBCUTANEOUS at 11:13

## 2017-09-21 RX ADMIN — Medication 1000 UNIT(S): at 11:13

## 2017-09-21 RX ADMIN — MEMANTINE HYDROCHLORIDE 5 MILLIGRAM(S): 10 TABLET ORAL at 17:59

## 2017-09-21 RX ADMIN — Medication 81 MILLIGRAM(S): at 11:13

## 2017-09-21 NOTE — PROGRESS NOTE ADULT - PROBLEM SELECTOR PLAN 6
Discussed with wife in detail who is HCP. Patient is DNR/DNI, no aggressive measures. MOLST form completed

## 2017-09-21 NOTE — PROGRESS NOTE ADULT - PROBLEM SELECTOR PLAN 1
patient has h/o multiple UTI in past.  Was recently started on Cefuroxime as outpatient on last Tuesday.  SIRS criteria 2/4 positive on admission  had hypotension and elevated wbc   qSOFA score of 1 given hypotension.  Lactate wnl.  Urine cultures negative  Blood cultures negative.  Continue IV Rocephin for now as per ID.  s/p 2 L bolus in ED, continue with IV hydration.  antibiotics per ID consult- Dr. Gong  F/u labs today

## 2017-09-21 NOTE — PROGRESS NOTE ADULT - SUBJECTIVE AND OBJECTIVE BOX
Medicine attending note:       Patient is a 82 year old male who presents with a chief complaint of generalized weakness, poor oral intake (18 Sep 2017 12:07)      Patient seen and examined today at bedside. Patient states he feels well, denies any complaints at time of evaluation, but states he has no appetite today. Patient answers yes and no to questions asked. Patient is poor historian secondary to dementia.     MEDICATIONS  (STANDING):  cefTRIAXone   IVPB      enoxaparin Injectable 40 milliGRAM(s) SubCutaneous daily  cefTRIAXone   IVPB 1 Gram(s) IV Intermittent every 24 hours  aspirin enteric coated 81 milliGRAM(s) Oral daily  simvastatin 20 milliGRAM(s) Oral at bedtime  donepezil 5 milliGRAM(s) Oral at bedtime  memantine 5 milliGRAM(s) Oral two times a day  multivitamin 1 Tablet(s) Oral daily  cholecalciferol 1000 Unit(s) Oral daily  influenza   Vaccine 0.5 milliLiter(s) IntraMuscular once  sodium chloride 0.9%. 1000 milliLiter(s) (75 mL/Hr) IV Continuous <Continuous>      REVIEW OF SYSTEMS:  CONSTITUTIONAL: Positive for fatigue, poor appetite no fever, or chills  EYES: No eye pain, visual disturbances, or discharge  ENMT:  No difficulty hearing, tinnitus, vertigo; No sinus or throat pain  NECK: No pain or stiffness  RESPIRATORY: No cough, wheezing, chills or hemoptysis; No shortness of breath  CARDIOVASCULAR: No chest pain, palpitations, dizziness, or leg swelling  GASTROINTESTINAL: No abdominal or epigastric pain. No nausea, vomiting, or hematemesis; No diarrhea or constipation. No melena or hematochezia.  GENITOURINARY: No dysuria, frequency, hematuria, or incontinence  NEUROLOGICAL: No headaches, memory loss, loss of strength, numbness, or tremors  SKIN: No itching, burning, rashes, or lesions   LYMPH NODES: No enlarged glands  ENDOCRINE: No heat or cold intolerance; No hair loss  MUSCULOSKELETAL: No joint pain or swelling; No muscle, back, or extremity pain  PSYCHIATRIC: No depression, anxiety, mood swings, or difficulty sleeping  HEME/LYMPH: No easy bruising, or bleeding gums  ALLERY AND IMMUNOLOGIC: No hives or eczema  Patient is poor historian secondary to dementia, but answers yes and no to above questions.         PHYSICAL EXAM:  T(C): 37.1 (09-21-17 @ 05:51), Max: 37.4 (09-20-17 @ 20:51)  HR: 83 (09-21-17 @ 05:51) (83 - 101)  BP: 112/75 (09-21-17 @ 05:51) (96/66 - 112/75)  RR: 17 (09-21-17 @ 05:51) (17 - 18)  SpO2: 100% (09-21-17 @ 05:51) (96% - 100%)  I&O's Summary    20 Sep 2017 07:01  -  21 Sep 2017 07:00  --------------------------------------------------------  IN: 1850 mL / OUT: 0 mL / NET: 1850 mL      GENERAL: Elderly male, NAD, well-groomed, well-developed, sitting up in chair  HEAD:  Atraumatic, Normocephalic  EYES: EOMI, PERRL, conjunctiva and sclera clear  ENMT: No tonsillar erythema, exudates, or enlargement; Moist mucous membranes, Good dentition, No lesions  NECK: Supple, No JVD, Normal thyroid  NERVOUS SYSTEM:  Alert & Oriented X1, no focal deficit  CHEST/LUNG: Clear to percussion bilaterally; No rales, rhonchi, wheezing, or rubs  HEART: Regular rate and rhythm; No murmurs, rubs, or gallops  ABDOMEN: Soft, Nontender, Nondistended; Bowel sounds present  EXTREMITIES:  2+ Peripheral Pulses, No clubbing, cyanosis, or edema  LYMPH: No lymphadenopathy noted  SKIN: No rashes or lesions    LABS: pending for today                                     13.1   15.4  )-----------( 385      ( 20 Sep 2017 09:09 )             37.5     09-20    139  |  107  |  9   ----------------------------<  74  4.1   |  24  |  0.53    Ca    7.9<L>      20 Sep 2017 09:09  Phos  2.8     09-19  Mg     2.2     09-19        RADIOLOGY & ADDITIONAL TESTS:    Imaging Personally Reviewed:  [x ] YES  [ ] NO    Consultant(s) Notes Reviewed:  [x ] YES  [ ] NO    Care Discussed with Consultants/Other Providers [x ] YES  [ ] NO

## 2017-09-21 NOTE — PROGRESS NOTE ADULT - PROBLEM SELECTOR PLAN 2
SIRS criteria 2/4 positive, given HR> 90 and elevated wbc, + UTI  qSOFA score of 1 given hypotension.  Lactate wnl.  Urine cultures negative  Blood cultures negative.  Continue IV Rocephin for now as per ID.  s/p 2 L bolus in ED, continue with IV hydration.

## 2017-09-21 NOTE — GOALS OF CARE CONVERSATION - PERSONAL ADVANCE DIRECTIVE - CONVERSATION DETAILS
During hospital course patient refusing food.  However, patient is responsive to oral intake of milkshake provided by his wife.  Patient has declined progressively with increased confusion and decreased PO in take intermittently, more likely than not, and decreased level of activity.  Wife is aware of his prognosis and would like to consider hospice both residential vs home hospice. Mrs Nunn is also agreeable to hiring home health service if warranted.

## 2017-09-21 NOTE — PROGRESS NOTE ADULT - SUBJECTIVE AND OBJECTIVE BOX
NP Note discussed with  primary attending    82y old Male who presented for generalized weakness and decreased PO intake.  States to doing "so,so."  Denies HA, dizziness, SOB, CP and abdominal pain.      INTERVAL HPI/OVERNIGHT EVENTS: no new complaints    MEDICATIONS  (STANDING):  cefTRIAXone   IVPB      enoxaparin Injectable 40 milliGRAM(s) SubCutaneous daily  cefTRIAXone   IVPB 1 Gram(s) IV Intermittent every 24 hours  aspirin enteric coated 81 milliGRAM(s) Oral daily  simvastatin 20 milliGRAM(s) Oral at bedtime  donepezil 5 milliGRAM(s) Oral at bedtime  memantine 5 milliGRAM(s) Oral two times a day  multivitamin 1 Tablet(s) Oral daily  cholecalciferol 1000 Unit(s) Oral daily  influenza   Vaccine 0.5 milliLiter(s) IntraMuscular once  sodium chloride 0.9%. 1000 milliLiter(s) (75 mL/Hr) IV Continuous <Continuous>    MEDICATIONS  (PRN):      __________________________________________________  REVIEW OF SYSTEMS:    CONSTITUTIONAL: No fever  RESPIRATORY: Occasional cough; No shortness of breath  CARDIOVASCULAR: No chest pain, no palpitations  GASTROINTESTINAL: No pain. No nausea or vomiting.  No diarrhea   NEUROLOGICAL: Alz. Dementia  MUSCULOSKELETAL: No joint pain, no muscle pain  GENITOURINARY: Denies dysuria, increase frequency or hesistancy  PSYCHIATRY: No depression , no anxiety  ALL OTHER  ROS negative        Vital Signs Last 24 Hrs  T(C): 37.1 (21 Sep 2017 05:51), Max: 37.4 (20 Sep 2017 20:51)  T(F): 98.8 (21 Sep 2017 05:51), Max: 99.3 (20 Sep 2017 20:51)  HR: 83 (21 Sep 2017 05:51) (83 - 101)  BP: 112/75 (21 Sep 2017 05:51) (96/66 - 112/75)  BP(mean): --  RR: 17 (21 Sep 2017 05:51) (17 - 18)  SpO2: 100% (21 Sep 2017 05:51) (96% - 100%)    ________________________________________________  PHYSICAL EXAM:  GENERAL: NAD  HEENT: Normocephalic;  conjunctivae and sclerae clear; moist mucous membranes;   NECK : Supple  CHEST/LUNG: Clear to auscultation bilaterally with good air entry   HEART: S1 S2, regular; no murmurs, gallops or rubs  ABDOMEN: Soft, Nontender, Nondistended; Bowel sounds present x 4 quad  EXTREMITIES: No cyanosis; no edema; no calf tenderness  SKIN: Warm and dry  NERVOUS SYSTEM:  Awake and alert  _________________________________________________  LABS:                        13.1   15.4  )-----------( 385      ( 20 Sep 2017 09:09 )             37.5     09-20    139  |  107  |  9   ----------------------------<  74  4.1   |  24  |  0.53    Ca    7.9<L>      20 Sep 2017 09:09          CAPILLARY BLOOD GLUCOSE            RADIOLOGY & ADDITIONAL TESTS:    Imaging Personally Reviewed:  YES/NO    Consultant(s) Notes Reviewed:   YES/ No    Care Discussed with Consultants :     Plan of care was discussed with patient and /or primary care giver; all questions and concerns were addressed and care was aligned with patient's wishes.

## 2017-09-21 NOTE — GOALS OF CARE CONVERSATION - PERSONAL ADVANCE DIRECTIVE - TREATMENT GUIDELINE COMMENT
Hospitalize for pain management or antibiotics.  Would like to continue recreational feeds as desired.

## 2017-09-22 ENCOUNTER — TRANSCRIPTION ENCOUNTER (OUTPATIENT)
Age: 82
End: 2017-09-22

## 2017-09-22 VITALS
DIASTOLIC BLOOD PRESSURE: 67 MMHG | RESPIRATION RATE: 16 BRPM | HEART RATE: 82 BPM | TEMPERATURE: 98 F | OXYGEN SATURATION: 98 % | SYSTOLIC BLOOD PRESSURE: 104 MMHG

## 2017-09-22 LAB
ANION GAP SERPL CALC-SCNC: 8 MMOL/L — SIGNIFICANT CHANGE UP (ref 5–17)
BASOPHILS # BLD AUTO: 0.1 K/UL — SIGNIFICANT CHANGE UP (ref 0–0.2)
BASOPHILS NFR BLD AUTO: 1.1 % — SIGNIFICANT CHANGE UP (ref 0–2)
BUN SERPL-MCNC: 9 MG/DL — SIGNIFICANT CHANGE UP (ref 7–18)
CALCIUM SERPL-MCNC: 7.8 MG/DL — LOW (ref 8.4–10.5)
CHLORIDE SERPL-SCNC: 107 MMOL/L — SIGNIFICANT CHANGE UP (ref 96–108)
CO2 SERPL-SCNC: 24 MMOL/L — SIGNIFICANT CHANGE UP (ref 22–31)
CREAT SERPL-MCNC: 0.49 MG/DL — LOW (ref 0.5–1.3)
EOSINOPHIL # BLD AUTO: 0.6 K/UL — HIGH (ref 0–0.5)
EOSINOPHIL NFR BLD AUTO: 4.6 % — SIGNIFICANT CHANGE UP (ref 0–6)
GLUCOSE SERPL-MCNC: 93 MG/DL — SIGNIFICANT CHANGE UP (ref 70–99)
HCT VFR BLD CALC: 34 % — LOW (ref 39–50)
HGB BLD-MCNC: 11.6 G/DL — LOW (ref 13–17)
LYMPHOCYTES # BLD AUTO: 0.9 K/UL — LOW (ref 1–3.3)
LYMPHOCYTES # BLD AUTO: 7.1 % — LOW (ref 13–44)
MCHC RBC-ENTMCNC: 31.7 PG — SIGNIFICANT CHANGE UP (ref 27–34)
MCHC RBC-ENTMCNC: 34.1 GM/DL — SIGNIFICANT CHANGE UP (ref 32–36)
MCV RBC AUTO: 93 FL — SIGNIFICANT CHANGE UP (ref 80–100)
MONOCYTES # BLD AUTO: 0.7 K/UL — SIGNIFICANT CHANGE UP (ref 0–0.9)
MONOCYTES NFR BLD AUTO: 5.3 % — SIGNIFICANT CHANGE UP (ref 2–14)
NEUTROPHILS # BLD AUTO: 10.6 K/UL — HIGH (ref 1.8–7.4)
NEUTROPHILS NFR BLD AUTO: 82 % — HIGH (ref 43–77)
PLATELET # BLD AUTO: 354 K/UL — SIGNIFICANT CHANGE UP (ref 150–400)
POTASSIUM SERPL-MCNC: 3.7 MMOL/L — SIGNIFICANT CHANGE UP (ref 3.5–5.3)
POTASSIUM SERPL-SCNC: 3.7 MMOL/L — SIGNIFICANT CHANGE UP (ref 3.5–5.3)
RBC # BLD: 3.66 M/UL — LOW (ref 4.2–5.8)
RBC # FLD: 13.9 % — SIGNIFICANT CHANGE UP (ref 10.3–14.5)
SODIUM SERPL-SCNC: 139 MMOL/L — SIGNIFICANT CHANGE UP (ref 135–145)
WBC # BLD: 12.9 K/UL — HIGH (ref 3.8–10.5)
WBC # FLD AUTO: 12.9 K/UL — HIGH (ref 3.8–10.5)

## 2017-09-22 RX ORDER — CEFUROXIME AXETIL 250 MG
1 TABLET ORAL
Qty: 4 | Refills: 0 | OUTPATIENT
Start: 2017-09-22 | End: 2017-09-24

## 2017-09-22 RX ADMIN — MEMANTINE HYDROCHLORIDE 5 MILLIGRAM(S): 10 TABLET ORAL at 05:04

## 2017-09-22 RX ADMIN — Medication 81 MILLIGRAM(S): at 12:34

## 2017-09-22 RX ADMIN — SODIUM CHLORIDE 75 MILLILITER(S): 9 INJECTION INTRAMUSCULAR; INTRAVENOUS; SUBCUTANEOUS at 12:35

## 2017-09-22 RX ADMIN — SODIUM CHLORIDE 75 MILLILITER(S): 9 INJECTION INTRAMUSCULAR; INTRAVENOUS; SUBCUTANEOUS at 07:49

## 2017-09-22 RX ADMIN — ENOXAPARIN SODIUM 40 MILLIGRAM(S): 100 INJECTION SUBCUTANEOUS at 12:35

## 2017-09-22 RX ADMIN — Medication 1 TABLET(S): at 12:34

## 2017-09-22 RX ADMIN — CEFTRIAXONE 100 GRAM(S): 500 INJECTION, POWDER, FOR SOLUTION INTRAMUSCULAR; INTRAVENOUS at 11:01

## 2017-09-22 RX ADMIN — Medication 1000 UNIT(S): at 12:34

## 2017-09-22 NOTE — DISCHARGE NOTE ADULT - OTHER SIGNIFICANT FINDINGS
Medicine attending note:    Patient seen and examined. Patient's history, vitals, labs, imaging studies reviewed. Discussed with NP, agree with discharge note. Patient is medically stable for discharge home, after arrangements made. Paient to follow up with PCP within 1 week. Plan of care discussed with patient, and wife, agrees, all questions answered.   Johana Shirley MD  Time Spent > 40 minutes  9/22/2017.

## 2017-09-22 NOTE — DISCHARGE NOTE ADULT - CARE PLAN
Principal Discharge DX:	UTI (urinary tract infection)  Goal:	resolution  Instructions for follow-up, activity and diet:	Continue with Ceftin by mouth, as prescribed for two more days (last dose on September 24, 2017).  Call your PCP, Dr. Blevins for a followup appointment in two to three days.  Secondary Diagnosis:	Alzheimer disease  Goal:	supportive care  Instructions for follow-up, activity and diet:	Continue with medication, as prescribed, and followup with your PCP in two to three days.  Secondary Diagnosis:	Failure to thrive in adult  Goal:	resolution  Instructions for follow-up, activity and diet:	You have demonstrated significant improvement.  Follow up with your PCP in two to three days.  Continue good nutrition and exercise, as tolerated.  Secondary Diagnosis:	Sepsis associated hypotension  Goal:	resolved  Instructions for follow-up, activity and diet:	Your blood pressure is stable.  Followup with your PCP before you take any antihypertensive medication.

## 2017-09-22 NOTE — DISCHARGE NOTE ADULT - PLAN OF CARE
resolution Continue with Ceftin by mouth, as prescribed for two more days (last dose on September 24, 2017).  Call your PCP, Dr. Blevins for a followup appointment in two to three days. supportive care Continue with medication, as prescribed, and followup with your PCP in two to three days. You have demonstrated significant improvement.  Follow up with your PCP in two to three days.  Continue good nutrition and exercise, as tolerated. resolved Your blood pressure is stable.  Followup with your PCP before you take any antihypertensive medication.

## 2017-09-22 NOTE — DISCHARGE NOTE ADULT - ADDITIONAL INSTRUCTIONS
Followup with your PCP in two to three days.  Complete two day course of antibiotic, Ceftin, for your UTI.

## 2017-09-22 NOTE — PROGRESS NOTE ADULT - ATTENDING COMMENTS
Patient seen and examined. Patient's history, vitals, labs, imaging studies reviewed. Discussed with NP, agree with discharge note. Patient is medically stable for discharge home, after arrangements made. Plan of care discussed with patient, and wife agrees, all questions answered.   Johana Shirley MD  9/22/2017
Patient seen and examined. Patient's history, vital's, labs, imaging studies reviewed. Plan of care discussed with patient, and wife agrees, all questions answered.   Johana Shirley MD  9/19/2017
Patient seen and examined. Patient's history, vitals, labs, imaging studies reviewed. F/u labs for today. Plan of care discussed with patient, and wife agrees, all questions answered.   Johana Shirley MD
Patient seen and examined. Patient's history, vitals, labs, imaging studies reviewed. Plan of care discussed with patient, and wife agrees, all questions answered.   Johana Shirley MD

## 2017-09-22 NOTE — PROGRESS NOTE ADULT - ASSESSMENT
81yo male w/ UTI, stable
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 Admitted for sepsis due to UTI.
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 Admitted for sepsis due to UTI.
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 Admitted for sepsis due to UTI.
leukocytosis - decreasing, no obvious reason for it  plan - on day 5 of rocephin , can switch to ceftin 250mgs po bid x 2 days more  dc planning for today  reconsult prn

## 2017-09-22 NOTE — DISCHARGE NOTE ADULT - MEDICATION SUMMARY - MEDICATIONS TO TAKE
I will START or STAY ON the medications listed below when I get home from the hospital:    Aspirin Enteric Coated 81 mg oral delayed release tablet  -- 1 tab(s) by mouth once a day  -- Indication: For CAD    Zocor 20 mg oral tablet  -- 1 tab(s) by mouth once a day (at bedtime)  -- Indication: For HLD    Ceftin 250 mg oral tablet  -- 1 tab(s) by mouth every 12 hours, with last dose on September 24, 2017.    -- Finish all this medication unless otherwise directed by prescriber.  Medication should be taken with plenty of water.  Take with food or milk.    -- Indication: For UTI (urinary tract infection)    donepezil 5 mg oral tablet  -- 1 tab(s) by mouth once a day (at bedtime)  -- Indication: For Alzheimer disease    memantine 5 mg oral tablet  -- 1 tab(s) by mouth 2 times a day  -- Indication: For Alzheimer disease    Daily Multiple Vitamins oral tablet  -- 1 tab(s) by mouth once a day  -- Indication: For Supplement    Vitamin D3 1000 intl units oral capsule  -- 1 cap(s) by mouth once a day  -- Indication: For Supplement

## 2017-09-22 NOTE — PROGRESS NOTE ADULT - SUBJECTIVE AND OBJECTIVE BOX
82y Male    Meds:  cefTRIAXone   IVPB      cefTRIAXone   IVPB 1 Gram(s) IV Intermittent every 24 hours    Allergies    No Known Allergies    Intolerances        VITALS:  Vital Signs Last 24 Hrs  T(C): 36.4 (22 Sep 2017 12:59), Max: 36.7 (21 Sep 2017 20:57)  T(F): 97.6 (22 Sep 2017 12:59), Max: 98 (21 Sep 2017 20:57)  HR: 82 (22 Sep 2017 12:59) (71 - 93)  BP: 104/67 (22 Sep 2017 12:59) (96/59 - 130/85)  BP(mean): --  RR: 16 (22 Sep 2017 12:59) (16 - 16)  SpO2: 98% (22 Sep 2017 12:59) (94% - 100%)    LABS/DIAGNOSTIC TESTS:                          11.6   12.9  )-----------( 354      ( 22 Sep 2017 06:02 )             34.0         09-22    139  |  107  |  9   ----------------------------<  93  3.7   |  24  |  0.49<L>    Ca    7.8<L>      22 Sep 2017 06:02    TPro  8.1  /  Alb  2.3<L>  /  TBili  0.6  /  DBili  x   /  AST  58<H>  /  ALT  51  /  AlkPhos  103  09-21      LIVER FUNCTIONS - ( 21 Sep 2017 13:58 )  Alb: 2.3 g/dL / Pro: 8.1 g/dL / ALK PHOS: 103 U/L / ALT: 51 U/L DA / AST: 58 U/L / GGT: x             CULTURES:       RADIOLOGY:      ROS:  [  ] UNABLE TO ELICIT 82y Male who presented with AMS but is much better now, he is AAO x 2-3 , he denies all complaints and is fully cooperative. no fevers and all his cultures have been negative. His leukocytosis has decreased to near normal and we don't have a reason for his wbc elevation.    Meds:  cefTRIAXone   IVPB      cefTRIAXone   IVPB 1 Gram(s) IV Intermittent every 24 hours    Allergies    No Known Allergies    Intolerances        VITALS:  Vital Signs Last 24 Hrs  T(C): 36.4 (22 Sep 2017 12:59), Max: 36.7 (21 Sep 2017 20:57)  T(F): 97.6 (22 Sep 2017 12:59), Max: 98 (21 Sep 2017 20:57)  HR: 82 (22 Sep 2017 12:59) (71 - 93)  BP: 104/67 (22 Sep 2017 12:59) (96/59 - 130/85)  BP(mean): --  RR: 16 (22 Sep 2017 12:59) (16 - 16)  SpO2: 98% (22 Sep 2017 12:59) (94% - 100%)    LABS/DIAGNOSTIC TESTS:                          11.6   12.9  )-----------( 354      ( 22 Sep 2017 06:02 )             34.0         09-22    139  |  107  |  9   ----------------------------<  93  3.7   |  24  |  0.49<L>    Ca    7.8<L>      22 Sep 2017 06:02    TPro  8.1  /  Alb  2.3<L>  /  TBili  0.6  /  DBili  x   /  AST  58<H>  /  ALT  51  /  AlkPhos  103  09-21      LIVER FUNCTIONS - ( 21 Sep 2017 13:58 )  Alb: 2.3 g/dL / Pro: 8.1 g/dL / ALK PHOS: 103 U/L / ALT: 51 U/L DA / AST: 58 U/L / GGT: x             CULTURES: all negative      RADIOLOGY:      ROS:  [  ] UNABLE TO ELICIT

## 2017-09-22 NOTE — DISCHARGE NOTE ADULT - HOSPITAL COURSE
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      In ED, upon admission T of 99.9, HR of 80, BP 84/65, RR 19, spO2 92% on room air. 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 Atelectasis lung bases. No pleural effusion. Labs significant for elevated wbc of 19.1; Lactate was wnl, 1.6;, UA positive for wbc >50, moderate Leukocyte esterase. 2 L bolus given is ED. Urine and blood cultures sent and started on Rocephin.    Pt admitted with Sepsis 2/2 UTI    UTI (urinary tract infection).    Patient has h/o multiple UTI's in the past.  He was recently started on Cefuroxime as outpatient on last Tuesday.  SIRS criteria 2/4 positive  has hypotension and elevated wbc.  qSOFA score of 1 given hypotension.  Lactate wnl.  Urine and blood cultures negative.  Started on Rocephin.  s/p 2 L bolus in ED, continue with IV hydration  Dr. Gong, ID, consulted and continued patient on Rocephin  WBC's trended down/Pt with notable improvement with improvement bp  Dr. Gong recommended two days of oral Ceftin, 250 mg, bid at home.       Failure to thrive in adult  Ensure proper hydration and nutrition with supplements.  Nutrition team consult. Calorie count.    Alzheimer disease.   Continued with home medications Namenda and Aricept.   Dose was adjusted on last admission.    Prophylactic measure.   Improved VTE score >2. Lovenox for DVT ppx.    Goals of care, counseling/discussion.   Discussed with wife in detail who is HCP. Patient is DNR/DNI.   MOLST form completed  Wife does not want hospice care at this time and requested pt to be   discharged to home with PT recommendations.  PT recommended home PT with HHA    Dr. Shirley, attending, discharged pt home with PT and HHA and two days of Ceftin, as well as followup with Dr. Vito Blevins, [phone].

## 2017-09-22 NOTE — DISCHARGE NOTE ADULT - PATIENT PORTAL LINK FT
“You can access the [facility] Patient Portal, offered by [facility], by registering with the following website: [email or web address]”

## 2017-09-22 NOTE — DISCHARGE NOTE ADULT - REASON FOR REFUSAL
Pt wife stated she believe he took it but cannot remember. She stated that she will follow-up his PMD.

## 2017-09-23 LAB
CULTURE RESULTS: SIGNIFICANT CHANGE UP
CULTURE RESULTS: SIGNIFICANT CHANGE UP
SPECIMEN SOURCE: SIGNIFICANT CHANGE UP
SPECIMEN SOURCE: SIGNIFICANT CHANGE UP

## 2017-09-25 DIAGNOSIS — J44.9 CHRONIC OBSTRUCTIVE PULMONARY DISEASE, UNSPECIFIED: ICD-10-CM

## 2017-09-25 DIAGNOSIS — N39.0 URINARY TRACT INFECTION, SITE NOT SPECIFIED: ICD-10-CM

## 2017-09-25 DIAGNOSIS — Z66 DO NOT RESUSCITATE: ICD-10-CM

## 2017-09-25 DIAGNOSIS — J98.11 ATELECTASIS: ICD-10-CM

## 2017-09-25 DIAGNOSIS — N18.9 CHRONIC KIDNEY DISEASE, UNSPECIFIED: ICD-10-CM

## 2017-09-25 DIAGNOSIS — A41.9 SEPSIS, UNSPECIFIED ORGANISM: ICD-10-CM

## 2017-09-25 DIAGNOSIS — Z85.51 PERSONAL HISTORY OF MALIGNANT NEOPLASM OF BLADDER: ICD-10-CM

## 2017-09-25 DIAGNOSIS — H26.9 UNSPECIFIED CATARACT: ICD-10-CM

## 2017-09-25 DIAGNOSIS — F02.80 DEMENTIA IN OTHER DISEASES CLASSIFIED ELSEWHERE, UNSPECIFIED SEVERITY, WITHOUT BEHAVIORAL DISTURBANCE, PSYCHOTIC DISTURBANCE, MOOD DISTURBANCE, AND ANXIETY: ICD-10-CM

## 2017-09-25 DIAGNOSIS — G30.9 ALZHEIMER'S DISEASE, UNSPECIFIED: ICD-10-CM

## 2017-09-25 DIAGNOSIS — Z87.891 PERSONAL HISTORY OF NICOTINE DEPENDENCE: ICD-10-CM

## 2017-09-25 DIAGNOSIS — M19.90 UNSPECIFIED OSTEOARTHRITIS, UNSPECIFIED SITE: ICD-10-CM

## 2017-09-25 DIAGNOSIS — N40.0 BENIGN PROSTATIC HYPERPLASIA WITHOUT LOWER URINARY TRACT SYMPTOMS: ICD-10-CM

## 2017-09-25 DIAGNOSIS — E78.5 HYPERLIPIDEMIA, UNSPECIFIED: ICD-10-CM

## 2017-09-25 DIAGNOSIS — R62.7 ADULT FAILURE TO THRIVE: ICD-10-CM

## 2017-10-07 ENCOUNTER — LABORATORY RESULT (OUTPATIENT)
Age: 82
End: 2017-10-07

## 2017-10-07 ENCOUNTER — APPOINTMENT (OUTPATIENT)
Dept: INTERNAL MEDICINE | Facility: CLINIC | Age: 82
End: 2017-10-07
Payer: MEDICARE

## 2017-10-07 VITALS
RESPIRATION RATE: 18 BRPM | TEMPERATURE: 98 F | HEIGHT: 65 IN | BODY MASS INDEX: 21.49 KG/M2 | DIASTOLIC BLOOD PRESSURE: 70 MMHG | WEIGHT: 129 LBS | OXYGEN SATURATION: 94 % | HEART RATE: 98 BPM | SYSTOLIC BLOOD PRESSURE: 90 MMHG

## 2017-10-07 VITALS — HEART RATE: 96 BPM | OXYGEN SATURATION: 93 % | TEMPERATURE: 98 F

## 2017-10-07 DIAGNOSIS — R91.1 SOLITARY PULMONARY NODULE: ICD-10-CM

## 2017-10-07 DIAGNOSIS — M19.90 UNSPECIFIED OSTEOARTHRITIS, UNSPECIFIED SITE: ICD-10-CM

## 2017-10-07 DIAGNOSIS — F03.90 UNSPECIFIED DEMENTIA W/OUT BEHAVIORAL DISTURBANCE: ICD-10-CM

## 2017-10-07 DIAGNOSIS — G30.9 ALZHEIMER'S DISEASE, UNSPECIFIED: ICD-10-CM

## 2017-10-07 DIAGNOSIS — E78.00 PURE HYPERCHOLESTEROLEMIA, UNSPECIFIED: ICD-10-CM

## 2017-10-07 DIAGNOSIS — N40.0 BENIGN PROSTATIC HYPERPLASIA WITHOUT LOWER URINARY TRACT SYMPMS: ICD-10-CM

## 2017-10-07 DIAGNOSIS — Z86.79 PERSONAL HISTORY OF OTHER DISEASES OF THE CIRCULATORY SYSTEM: ICD-10-CM

## 2017-10-07 DIAGNOSIS — N18.9 CHRONIC KIDNEY DISEASE, UNSPECIFIED: ICD-10-CM

## 2017-10-07 DIAGNOSIS — Z00.00 ENCOUNTER FOR GENERAL ADULT MEDICAL EXAMINATION W/OUT ABNORMAL FINDINGS: ICD-10-CM

## 2017-10-07 DIAGNOSIS — Z81.8 FAMILY HISTORY OF OTHER MENTAL AND BEHAVIORAL DISORDERS: ICD-10-CM

## 2017-10-07 DIAGNOSIS — J44.9 CHRONIC OBSTRUCTIVE PULMONARY DISEASE, UNSPECIFIED: ICD-10-CM

## 2017-10-07 DIAGNOSIS — I95.9 HYPOTENSION, UNSPECIFIED: ICD-10-CM

## 2017-10-07 DIAGNOSIS — Z83.3 FAMILY HISTORY OF DIABETES MELLITUS: ICD-10-CM

## 2017-10-07 DIAGNOSIS — Z80.9 FAMILY HISTORY OF MALIGNANT NEOPLASM, UNSPECIFIED: ICD-10-CM

## 2017-10-07 DIAGNOSIS — I71.9 AORTIC ANEURYSM OF UNSPECIFIED SITE, W/OUT RUPTURE: ICD-10-CM

## 2017-10-07 PROCEDURE — 90662 IIV NO PRSV INCREASED AG IM: CPT

## 2017-10-07 PROCEDURE — 99203 OFFICE O/P NEW LOW 30 MIN: CPT | Mod: 25

## 2017-10-07 PROCEDURE — 36415 COLL VENOUS BLD VENIPUNCTURE: CPT

## 2017-10-07 PROCEDURE — G0008: CPT

## 2017-10-07 RX ORDER — MEMANTINE HYDROCHLORIDE 5 MG/1
5 TABLET, FILM COATED ORAL
Qty: 60 | Refills: 0 | Status: ACTIVE | COMMUNITY
Start: 2017-08-18

## 2017-10-07 RX ORDER — DONEPEZIL HYDROCHLORIDE 5 MG/1
5 TABLET ORAL
Qty: 30 | Refills: 0 | Status: ACTIVE | COMMUNITY
Start: 2017-08-18

## 2017-10-09 LAB
ALBUMIN SERPL ELPH-MCNC: 3.4 G/DL
ALP BLD-CCNC: 103 U/L
ALT SERPL-CCNC: 95 U/L
ANION GAP SERPL CALC-SCNC: 23 MMOL/L
AST SERPL-CCNC: 69 U/L
BASOPHILS # BLD AUTO: 0.05 K/UL
BASOPHILS NFR BLD AUTO: 0.3 %
BILIRUB SERPL-MCNC: 1.4 MG/DL
BUN SERPL-MCNC: 18 MG/DL
CALCIUM SERPL-MCNC: 10.1 MG/DL
CHLORIDE SERPL-SCNC: 98 MMOL/L
CHOLEST SERPL-MCNC: 146 MG/DL
CHOLEST/HDLC SERPL: 2.9 RATIO
CO2 SERPL-SCNC: 18 MMOL/L
CREAT SERPL-MCNC: 1.03 MG/DL
EOSINOPHIL # BLD AUTO: 0.22 K/UL
EOSINOPHIL NFR BLD AUTO: 1.3 %
FOLATE SERPL-MCNC: >20 NG/ML
GLUCOSE SERPL-MCNC: 83 MG/DL
HBA1C MFR BLD HPLC: 5.6 %
HCT VFR BLD CALC: 40.6 %
HDLC SERPL-MCNC: 50 MG/DL
HGB BLD-MCNC: 13.1 G/DL
IMM GRANULOCYTES NFR BLD AUTO: 0.2 %
LDLC SERPL CALC-MCNC: 76 MG/DL
LYMPHOCYTES # BLD AUTO: 1.57 K/UL
LYMPHOCYTES NFR BLD AUTO: 9 %
MAN DIFF?: NORMAL
MCHC RBC-ENTMCNC: 30.1 PG
MCHC RBC-ENTMCNC: 32.3 GM/DL
MCV RBC AUTO: 93.3 FL
MONOCYTES # BLD AUTO: 0.78 K/UL
MONOCYTES NFR BLD AUTO: 4.5 %
NEUTROPHILS # BLD AUTO: 14.72 K/UL
NEUTROPHILS NFR BLD AUTO: 84.7 %
PLATELET # BLD AUTO: 382 K/UL
POTASSIUM SERPL-SCNC: 5.1 MMOL/L
PROT SERPL-MCNC: 8 G/DL
RBC # BLD: 4.35 M/UL
RBC # FLD: 16.1 %
SODIUM SERPL-SCNC: 139 MMOL/L
TRIGL SERPL-MCNC: 98 MG/DL
TSH SERPL-ACNC: 5.26 UIU/ML
VIT B12 SERPL-MCNC: 1425 PG/ML
WBC # FLD AUTO: 17.37 K/UL

## 2017-10-10 LAB — 25(OH)D3 SERPL-MCNC: 37.6 NG/ML

## 2017-10-14 LAB — METHYLMALONATE SERPL-SCNC: 238 NMOL/L

## 2017-10-19 PROCEDURE — 83605 ASSAY OF LACTIC ACID: CPT

## 2017-10-19 PROCEDURE — 85730 THROMBOPLASTIN TIME PARTIAL: CPT

## 2017-10-19 PROCEDURE — 81001 URINALYSIS AUTO W/SCOPE: CPT

## 2017-10-19 PROCEDURE — 87040 BLOOD CULTURE FOR BACTERIA: CPT

## 2017-10-19 PROCEDURE — 93005 ELECTROCARDIOGRAM TRACING: CPT

## 2017-10-19 PROCEDURE — 80053 COMPREHEN METABOLIC PANEL: CPT

## 2017-10-19 PROCEDURE — 83735 ASSAY OF MAGNESIUM: CPT

## 2017-10-19 PROCEDURE — 99285 EMERGENCY DEPT VISIT HI MDM: CPT | Mod: 25

## 2017-10-19 PROCEDURE — 85610 PROTHROMBIN TIME: CPT

## 2017-10-19 PROCEDURE — 85027 COMPLETE CBC AUTOMATED: CPT

## 2017-10-19 PROCEDURE — 97161 PT EVAL LOW COMPLEX 20 MIN: CPT

## 2017-10-19 PROCEDURE — 80048 BASIC METABOLIC PNL TOTAL CA: CPT

## 2017-10-19 PROCEDURE — 97116 GAIT TRAINING THERAPY: CPT

## 2017-10-19 PROCEDURE — 36415 COLL VENOUS BLD VENIPUNCTURE: CPT

## 2017-10-19 PROCEDURE — 71045 X-RAY EXAM CHEST 1 VIEW: CPT

## 2017-10-19 PROCEDURE — 84100 ASSAY OF PHOSPHORUS: CPT

## 2017-10-19 PROCEDURE — 87086 URINE CULTURE/COLONY COUNT: CPT

## 2017-11-07 ENCOUNTER — APPOINTMENT (OUTPATIENT)
Dept: INTERNAL MEDICINE | Facility: CLINIC | Age: 82
End: 2017-11-07

## 2018-02-10 NOTE — ED PROVIDER NOTE - CONSTITUTIONAL, MLM
02/10/18 1400   Group 3   Start Time 1300   Group Name Coping skills   Attendance Not present      normal... Well appearing, well nourished, awake, alert, oriented to person, place, time/situation and in no apparent distress.

## 2018-07-16 PROBLEM — I25.10 ATHEROSCLEROTIC HEART DISEASE OF NATIVE CORONARY ARTERY WITHOUT ANGINA PECTORIS: Chronic | Status: ACTIVE | Noted: 2017-04-25

## 2019-04-26 NOTE — ED PROVIDER NOTE - NSCAREINITIATED _GEN_ER
----- Message from Melodie Davis sent at 4/25/2019  3:38 PM EDT -----  Regarding: Dr. Jeaneth Nieves  Pt is requesting a call back from Dr. Thad Mathias or nurse in reference to \"Botox\". [facility] (info on file) would like to schedule delivery for tomorrow 4/26/19. Pt also would like to know the [facility] appt to make. Best contact [phone]. Jarred Noble(Attending)

## 2020-07-28 NOTE — PATIENT PROFILE ADULT. - NS PRO OT REFERRAL QUES 1 YN
Patient is a 51 year old male from home with PMHx of parkinson's disease, CHFrEF(EF35-40%), CAD, HTN, BPH s/p TURP presenting with chief complaint of right lower groin pain and testicular pain. Patient had a cervical spine surgery approximately 7 days prior to presentation in which patient endorsed had a Elmore catheter that was "shoved up" his penis causing pain. For the past 2 days, patient has had right lower groin pain, testicular pain as per patient's sister, also with dysuria and hematuria. Endorsed feeling feverish with chills, nausea with no vomiting. Denied chest pain, SOB, abdominal pain. No recent travel, not sexually active    In the ED, patient noted to be UA positive, given ceftriaxone. Patient was septic, with vitals significant for hypotension and sinus tachycardia in which patient was given 2L bolus of NS. CBC significant for leukocytosis unable to determine

## 2021-04-27 NOTE — CONSULT NOTE ADULT - NEUROLOGICAL
PA Initiation    Medication: LYRICA 200 MG capsule BATSHEVA  Insurance Company: [employer] - Phone [phone] Fax [phone]  Pharmacy Filling the Rx: [id number] [place] [facility] [place] - [address] [address] [place] [address]  Filling Pharmacy Phone: [phone]  Filling Pharmacy Fax: [phone]  Start Date: 4/27/2021         detailed exam

## 2023-04-05 NOTE — ASU PATIENT PROFILE, ADULT - TEACHING/LEARNING RELIGIOUS CONSIDERATIONS
Writer spoke with patient, conveyed results for CMP, Lipid panel. Patient voiced understanding.    none

## 2023-09-14 NOTE — DIETITIAN INITIAL EVALUATION ADULT. - OTHER INFO
Patient seen for LOS x6days. Patient from  home lives with wife. Visited pt. reports appetite fair >1week with weakness, eats 2-3 meals, denies nausea/vomiting or diarrhea PTA, stated unsure of wt. hx but wt. stable??, in house [id number]- 40% with Ensure supplement & tolerating, observed dinner meals, encourage po intake, D/W RN, skin intact. No

## 2023-10-26 NOTE — ADVANCED PRACTICE NURSE CONSULT - ASSESSMENT
Patient called. Current pharmacy listed does not have drug in stock. Reordered and routed for signature. This is a 82yr old male patient admitted for Weakness, presenting with blanchable erythema to the Bilateral Heels and Bilateral Elbows

## 2023-11-15 NOTE — ASU DISCHARGE PLAN (ADULT/PEDIATRIC). - MEDICATION SUMMARY - MEDICATIONS TO STOP TAKING
I will STOP taking the medications listed below when I get home from the hospital:  None
Detail Level: Zone

## 2025-06-26 NOTE — PROGRESS NOTE ADULT - PROBLEM SELECTOR PLAN 5
[FreeTextEntry3] : All medical record entries made by the Scribe were at my, Dr. Lauren Shikowitz-Behr, MD, direction and personally dictated by me on 06/24/2025. I have reviewed the chart and agree that the record accurately reflects my personal performance of the history, physical exam, assessment and plan. I have also personally directed, reviewed, and agreed with the chart.  Improved VTE score >2. Lovenox for DVT ppx.